# Patient Record
Sex: FEMALE | Race: WHITE | NOT HISPANIC OR LATINO | Employment: OTHER | URBAN - METROPOLITAN AREA
[De-identification: names, ages, dates, MRNs, and addresses within clinical notes are randomized per-mention and may not be internally consistent; named-entity substitution may affect disease eponyms.]

---

## 2021-05-31 ENCOUNTER — RECORDS - HEALTHEAST (OUTPATIENT)
Dept: ADMINISTRATIVE | Facility: CLINIC | Age: 71
End: 2021-05-31

## 2021-12-26 ENCOUNTER — APPOINTMENT (OUTPATIENT)
Dept: MRI IMAGING | Facility: CLINIC | Age: 71
End: 2021-12-26
Attending: EMERGENCY MEDICINE
Payer: COMMERCIAL

## 2021-12-26 ENCOUNTER — HOSPITAL ENCOUNTER (OUTPATIENT)
Facility: CLINIC | Age: 71
Setting detail: OBSERVATION
Discharge: HOME | End: 2021-12-27
Attending: INTERNAL MEDICINE | Admitting: INTERNAL MEDICINE
Payer: COMMERCIAL

## 2021-12-26 ENCOUNTER — HOSPITAL ENCOUNTER (EMERGENCY)
Facility: CLINIC | Age: 71
Discharge: ANOTHER HOSPITAL | End: 2021-12-26
Attending: EMERGENCY MEDICINE | Admitting: EMERGENCY MEDICINE
Payer: COMMERCIAL

## 2021-12-26 ENCOUNTER — APPOINTMENT (OUTPATIENT)
Dept: CT IMAGING | Facility: CLINIC | Age: 71
End: 2021-12-26
Attending: EMERGENCY MEDICINE
Payer: COMMERCIAL

## 2021-12-26 VITALS
HEART RATE: 78 BPM | TEMPERATURE: 97.7 F | OXYGEN SATURATION: 100 % | RESPIRATION RATE: 30 BRPM | WEIGHT: 219 LBS | SYSTOLIC BLOOD PRESSURE: 171 MMHG | DIASTOLIC BLOOD PRESSURE: 74 MMHG

## 2021-12-26 DIAGNOSIS — N30.00 ACUTE CYSTITIS WITHOUT HEMATURIA: ICD-10-CM

## 2021-12-26 DIAGNOSIS — G45.4 TRANSIENT GLOBAL AMNESIA: ICD-10-CM

## 2021-12-26 PROBLEM — R73.9 HYPERGLYCEMIA: Status: ACTIVE | Noted: 2021-12-26

## 2021-12-26 PROBLEM — D72.829 LEUKOCYTOSIS: Status: ACTIVE | Noted: 2021-12-26

## 2021-12-26 PROBLEM — R11.0 NAUSEA: Status: ACTIVE | Noted: 2021-12-26

## 2021-12-26 LAB
ALBUMIN UR-MCNC: 10 MG/DL
ANION GAP SERPL CALCULATED.3IONS-SCNC: 10 MMOL/L (ref 5–18)
APPEARANCE UR: CLEAR
APTT PPP: 27 SECONDS (ref 22–38)
ATRIAL RATE - MUSE: 93 BPM
BACTERIA #/AREA URNS HPF: ABNORMAL /HPF
BASOPHILS # BLD AUTO: 0 10E3/UL (ref 0–0.2)
BASOPHILS NFR BLD AUTO: 0 %
BILIRUB UR QL STRIP: NEGATIVE
BUN SERPL-MCNC: 24 MG/DL (ref 8–28)
CALCIUM SERPL-MCNC: 8.4 MG/DL (ref 8.5–10.5)
CHLORIDE BLD-SCNC: 106 MMOL/L (ref 98–107)
CO2 SERPL-SCNC: 25 MMOL/L (ref 22–31)
COLOR UR AUTO: ABNORMAL
CREAT BLD-MCNC: 0.8 MG/DL (ref 0.6–1.1)
CREAT SERPL-MCNC: 0.73 MG/DL (ref 0.6–1.1)
DIASTOLIC BLOOD PRESSURE - MUSE: 75 MMHG
EOSINOPHIL # BLD AUTO: 0.1 10E3/UL (ref 0–0.7)
EOSINOPHIL NFR BLD AUTO: 1 %
ERYTHROCYTE [DISTWIDTH] IN BLOOD BY AUTOMATED COUNT: 12.5 % (ref 10–15)
GFR SERPL CREATININE-BSD FRML MDRD: 86 ML/MIN/1.73M2
GFR SERPL CREATININE-BSD FRML MDRD: >60 ML/MIN/1.73M2
GLUCOSE BLD-MCNC: 146 MG/DL (ref 70–125)
GLUCOSE BLDC GLUCOMTR-MCNC: 135 MG/DL (ref 70–99)
GLUCOSE UR STRIP-MCNC: NEGATIVE MG/DL
HCT VFR BLD AUTO: 40.1 % (ref 35–47)
HGB BLD-MCNC: 12.9 G/DL (ref 11.7–15.7)
HGB UR QL STRIP: NEGATIVE
IMM GRANULOCYTES # BLD: 0.1 10E3/UL
IMM GRANULOCYTES NFR BLD: 0 %
INR PPP: 1.1 (ref 0.85–1.15)
INTERPRETATION ECG - MUSE: NORMAL
KETONES UR STRIP-MCNC: NEGATIVE MG/DL
LEUKOCYTE ESTERASE UR QL STRIP: NEGATIVE
LYMPHOCYTES # BLD AUTO: 0.8 10E3/UL (ref 0.8–5.3)
LYMPHOCYTES NFR BLD AUTO: 5 %
MCH RBC QN AUTO: 29.3 PG (ref 26.5–33)
MCHC RBC AUTO-ENTMCNC: 32.2 G/DL (ref 31.5–36.5)
MCV RBC AUTO: 91 FL (ref 78–100)
MONOCYTES # BLD AUTO: 0.9 10E3/UL (ref 0–1.3)
MONOCYTES NFR BLD AUTO: 6 %
NEUTROPHILS # BLD AUTO: 14.3 10E3/UL (ref 1.6–8.3)
NEUTROPHILS NFR BLD AUTO: 88 %
NITRATE UR QL: POSITIVE
NRBC # BLD AUTO: 0 10E3/UL
NRBC BLD AUTO-RTO: 0 /100
P AXIS - MUSE: 60 DEGREES
PH UR STRIP: 8.5 [PH] (ref 5–7)
PLATELET # BLD AUTO: 288 10E3/UL (ref 150–450)
POTASSIUM BLD-SCNC: 4.5 MMOL/L (ref 3.5–5)
PR INTERVAL - MUSE: 146 MS
QRS DURATION - MUSE: 84 MS
QT - MUSE: 376 MS
QTC - MUSE: 467 MS
R AXIS - MUSE: 49 DEGREES
RBC # BLD AUTO: 4.4 10E6/UL (ref 3.8–5.2)
RBC URINE: 3 /HPF
SARS-COV-2 RNA RESP QL NAA+PROBE: NEGATIVE
SODIUM SERPL-SCNC: 141 MMOL/L (ref 136–145)
SP GR UR STRIP: >1.05 (ref 1–1.03)
SQUAMOUS EPITHELIAL: 1 /HPF
SYSTOLIC BLOOD PRESSURE - MUSE: 176 MMHG
T AXIS - MUSE: 62 DEGREES
TROPONIN I SERPL-MCNC: <0.01 NG/ML (ref 0–0.29)
UROBILINOGEN UR STRIP-MCNC: <2 MG/DL
VENTRICULAR RATE- MUSE: 93 BPM
WBC # BLD AUTO: 16.3 10E3/UL (ref 4–11)
WBC URINE: 2 /HPF

## 2021-12-26 PROCEDURE — 36415 COLL VENOUS BLD VENIPUNCTURE: CPT | Performed by: EMERGENCY MEDICINE

## 2021-12-26 PROCEDURE — C9803 HOPD COVID-19 SPEC COLLECT: HCPCS

## 2021-12-26 PROCEDURE — 85730 THROMBOPLASTIN TIME PARTIAL: CPT | Performed by: EMERGENCY MEDICINE

## 2021-12-26 PROCEDURE — 70498 CT ANGIOGRAPHY NECK: CPT

## 2021-12-26 PROCEDURE — 85610 PROTHROMBIN TIME: CPT | Performed by: EMERGENCY MEDICINE

## 2021-12-26 PROCEDURE — G0379 DIRECT REFER HOSPITAL OBSERV: HCPCS

## 2021-12-26 PROCEDURE — 82565 ASSAY OF CREATININE: CPT | Mod: 91

## 2021-12-26 PROCEDURE — 87635 SARS-COV-2 COVID-19 AMP PRB: CPT | Performed by: EMERGENCY MEDICINE

## 2021-12-26 PROCEDURE — 87086 URINE CULTURE/COLONY COUNT: CPT | Performed by: EMERGENCY MEDICINE

## 2021-12-26 PROCEDURE — G0378 HOSPITAL OBSERVATION PER HR: HCPCS

## 2021-12-26 PROCEDURE — 81001 URINALYSIS AUTO W/SCOPE: CPT | Performed by: EMERGENCY MEDICINE

## 2021-12-26 PROCEDURE — 85025 COMPLETE CBC W/AUTO DIFF WBC: CPT | Performed by: EMERGENCY MEDICINE

## 2021-12-26 PROCEDURE — 250N000011 HC RX IP 250 OP 636: Performed by: EMERGENCY MEDICINE

## 2021-12-26 PROCEDURE — 96361 HYDRATE IV INFUSION ADD-ON: CPT

## 2021-12-26 PROCEDURE — 99285 EMERGENCY DEPT VISIT HI MDM: CPT | Mod: 25

## 2021-12-26 PROCEDURE — 99220 PR INITIAL OBSERVATION CARE,LEVEL III: CPT | Performed by: INTERNAL MEDICINE

## 2021-12-26 PROCEDURE — 96374 THER/PROPH/DIAG INJ IV PUSH: CPT | Mod: 59

## 2021-12-26 PROCEDURE — 84484 ASSAY OF TROPONIN QUANT: CPT | Performed by: EMERGENCY MEDICINE

## 2021-12-26 PROCEDURE — 96360 HYDRATION IV INFUSION INIT: CPT

## 2021-12-26 PROCEDURE — 93005 ELECTROCARDIOGRAM TRACING: CPT | Performed by: EMERGENCY MEDICINE

## 2021-12-26 PROCEDURE — 258N000003 HC RX IP 258 OP 636: Performed by: INTERNAL MEDICINE

## 2021-12-26 PROCEDURE — 80048 BASIC METABOLIC PNL TOTAL CA: CPT | Performed by: EMERGENCY MEDICINE

## 2021-12-26 PROCEDURE — 70551 MRI BRAIN STEM W/O DYE: CPT

## 2021-12-26 RX ORDER — ACETAMINOPHEN 650 MG/1
650 SUPPOSITORY RECTAL EVERY 6 HOURS PRN
Status: DISCONTINUED | OUTPATIENT
Start: 2021-12-26 | End: 2021-12-27 | Stop reason: HOSPADM

## 2021-12-26 RX ORDER — SODIUM CHLORIDE 9 MG/ML
INJECTION, SOLUTION INTRAVENOUS CONTINUOUS
Status: DISCONTINUED | OUTPATIENT
Start: 2021-12-26 | End: 2021-12-27

## 2021-12-26 RX ORDER — ACETAMINOPHEN 325 MG/1
650 TABLET ORAL EVERY 6 HOURS PRN
Status: DISCONTINUED | OUTPATIENT
Start: 2021-12-26 | End: 2021-12-27 | Stop reason: HOSPADM

## 2021-12-26 RX ORDER — HYDRALAZINE HYDROCHLORIDE 25 MG/1
25 TABLET, FILM COATED ORAL 3 TIMES DAILY PRN
Status: DISCONTINUED | OUTPATIENT
Start: 2021-12-26 | End: 2021-12-27 | Stop reason: HOSPADM

## 2021-12-26 RX ORDER — ONDANSETRON 2 MG/ML
4 INJECTION INTRAMUSCULAR; INTRAVENOUS EVERY 6 HOURS PRN
Status: DISCONTINUED | OUTPATIENT
Start: 2021-12-26 | End: 2021-12-27 | Stop reason: HOSPADM

## 2021-12-26 RX ORDER — CEFTRIAXONE 1 G/1
1 INJECTION, POWDER, FOR SOLUTION INTRAMUSCULAR; INTRAVENOUS ONCE
Status: COMPLETED | OUTPATIENT
Start: 2021-12-26 | End: 2021-12-26

## 2021-12-26 RX ORDER — IOPAMIDOL 755 MG/ML
100 INJECTION, SOLUTION INTRAVASCULAR ONCE
Status: COMPLETED | OUTPATIENT
Start: 2021-12-26 | End: 2021-12-26

## 2021-12-26 RX ORDER — LIDOCAINE 40 MG/G
CREAM TOPICAL
Status: DISCONTINUED | OUTPATIENT
Start: 2021-12-26 | End: 2021-12-27 | Stop reason: HOSPADM

## 2021-12-26 RX ORDER — ONDANSETRON 4 MG/1
4 TABLET, ORALLY DISINTEGRATING ORAL EVERY 6 HOURS PRN
Status: DISCONTINUED | OUTPATIENT
Start: 2021-12-26 | End: 2021-12-27 | Stop reason: HOSPADM

## 2021-12-26 RX ADMIN — IOPAMIDOL 75 ML: 755 INJECTION, SOLUTION INTRAVENOUS at 07:12

## 2021-12-26 RX ADMIN — CEFTRIAXONE 1 G: 1 INJECTION, POWDER, FOR SOLUTION INTRAMUSCULAR; INTRAVENOUS at 10:12

## 2021-12-26 RX ADMIN — SODIUM CHLORIDE: 9 INJECTION, SOLUTION INTRAVENOUS at 15:57

## 2021-12-26 ASSESSMENT — ENCOUNTER SYMPTOMS
CONFUSION: 1
APPETITE CHANGE: 0
CHILLS: 0
DIAPHORESIS: 0
FEVER: 0
COUGH: 0

## 2021-12-26 ASSESSMENT — MIFFLIN-ST. JEOR: SCORE: 1526.47

## 2021-12-26 NOTE — PHARMACY-ADMISSION MEDICATION HISTORY
Pharmacy Medication History  Admission medication history interview status for the 12/26/2021  admission is complete. See EPIC admission navigator for prior to admission medications     Location of Interview: Patient room  Medication history sources: Patient and Patient's family/friend (-William)    Significant changes made to the medication list:  None    In the past week, patient estimated taking medication this percent of the time:   Not applicable    Additional medication history information:   -She reports taking no regular medications. She may occasionally take OTC pain reliever but not on a regular basis.    Medication reconciliation completed by provider prior to medication history? No    Time spent in this activity: 5 min    Prior to Admission medications    None       The information provided in this note is only as accurate as the sources available at the time of update(s)

## 2021-12-26 NOTE — H&P
"[facility]    History and Physical  Hospitalist       Date of Admission:  12/26/2021    Assessment & Plan   71 year old female with unremarkable past medical history, who presents with possible episode of vomiting, and unable to recall any events from yesterday:    Summary:    Principal Problem:    Probable Transient global amnesia   -- does not recall what happened yesterday, Brain MRI normal at [facility] this AM   -- will check TSH, Vit B12   -- Neuro checks      Nausea -- might have vomited this AM (she doesn't remember   -- antiemetics PRN   -- IV fluids, appears dehydrated with urine Specific Gravity 1.050      Leukocytosis -- WBC 16K with no sign of infection, ?2nd to vomitting   -- CBC in AM      Hyperglycemia -- glucose 146, no hx of diabetes, ?2nd to stress   -- check glucose in AM        Covid 19 PCR negative -- and no URI symptoms.  Is not vaccinated, and her  stated (for he and the patient) that they will never get vaccinated \"because they don't know what they are doing with this Memory RNA\".   I mentioned it was Messenger RNA, and he said it doesn't matter and went on to explain how aluminum dementia, and the only thing that helps memory loss is prescription drugs, and supplements don't help ... but again I suggested to wife it would be a good idea to get a Covid vaccine at some point.     Plan: as above, discussed with patient and wife.     DVT Prophylaxis: Pneumatic Compression Devices  Code Status: Full Code    Disposition: Expected discharge tomorrow if stable.     Benjamin Rendonliza  Pager: [phone]  Cell Phone:  [phone]     Primary Care Physician   Physician No Ref-Primary    Chief Complaint   Memory loss (can't remember yesterday), may have vomited     History is obtained from Patient and      History of Present Illness    71 year old female who is healthy, was last normal at 10 PM last night when  went to bed, and then he woke up at " "5:24 AM today and noticed she wasn't in bed, and went looking for her, and she was standing facing the toilet and said \"I think I just threw up\".  On further questioning, she doesn't remember anything from yesterday.  She and her  had an uneventful [place] dinner with her daughter's family at her daughter's house, but she does not recall any of it.  No focal weakness or numbness.  No prior hx of memory problems.    brought her to [facility], where VS were normal, Brain MRI was normal, WBC was 16.3, glucose 146, but BMP normal and UA with SG > 1.050, nitrite positive, only 2 WBC's, 3 RBC's, and no reported dysuria and no hx of UTI.      Transferred to [facility] per their protocol because they have no neurologist that rounds there.      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  (last updated 2021)       Social History     Tobacco Use     Smoking status: Never Smoker     Smokeless tobacco: Not on file   Substance Use Topics     Alcohol use: Yes     Comment: < 1 drink a month     Drug use: Not on file        FAMILY HISTORY    Family History   Problem Relation Age of Onset     Lung Cancer Mother          age 82     Lung Cancer Father          age 69     Heart Disease Brother      Heart Disease Brother         Review of Systems   The 10 point Review of Systems is negative other than noted in the HPI or here.       PHYSICAL EXAM     Temp: 98.1  F (36.7  C) Temp src: Oral BP: (!) 166/84 Pulse: 85   Resp: 24 SpO2: 100 % O2 Device: None (Room air)    Vital Signs with Ranges  Temp:  [97.7  F (36.5 " " C)-98.1  F (36.7  C)] 98.1  F (36.7  C)  Pulse:  [77-92] 85  Resp:  [7-30] 24  BP: (154-191)/(69-84) 166/84  SpO2:  [97 %-100 %] 100 %  218 lbs 0 oz    Constitutional: Awake, alert, cooperative, no apparent distress.  Eyes: Conjunctiva and pupils examined and normal.  HEENT: Moist mucous membranes, normal dentition.  Respiratory: Clear to auscultation bilaterally, no crackles or wheezing.  Cardiovascular: Regular rate and rhythm, normal S1 and S2, and no murmur noted, no carotid bruits.  No ankle edema.   GI: Soft, non-distended, non-tender, normal bowel sounds.  Lymph/Hematologic: No anterior cervical, supraclavicular or axillary adenopathy.  Skin: No rashes, no cyanosis.   Musculoskeletal: No joint swelling, erythema or tenderness.  Neurologic: Alert, Ox3, but hesitant saying \"I know yesterday was [place] but I just can't remember any of it\", Cranial nerves 2-12 intact, no focal weakness or numbness  Psychiatric:  No obvious anxiety or depression.    Data   Data reviewed today:  I personally reviewed the EKG tracing showing NSR with rate of 93, slow R wave progression (decreased R wave in V3 compared to V2), otherwise normal EKG.  Recent Labs   Lab 12/26/21  0735 12/26/21  0715 12/26/21  0714 12/26/21  0657 12/26/21  0640   WBC  --   --  16.3*  --   --    HGB  --   --  12.9  --   --    MCV  --   --  91  --   --    PLT  --   --  288  --   --    INR  --  1.10  --   --   --      --   --   --   --    POTASSIUM 4.5  --   --   --   --    CHLORIDE 106  --   --   --   --    CO2 25  --   --   --   --    BUN 24  --   --   --   --    CR 0.73  --   --  0.8  --    ANIONGAP 10  --   --   --   --    WILD 8.4*  --   --   --   --    *  --   --   --  135*       Imaging:  Recent Results (from the past 24 hour(s))   CTA Head Neck with Contrast    Narrative    EXAM: CTA  HEAD NECK WITH CONTRAST  LOCATION: [facility]  DATE/TIME: 12/26/2021 6:45 AM    INDICATION: Code Stroke to evaluate for " potential thrombolysis and thrombectomy.  PLEASE READ IMMEDIATELY. Memory loss. Possible transient global amnesia.  COMPARISON: None.  CONTRAST: 75ml Isovue 370  TECHNIQUE: Head and neck CT angiogram with IV contrast. Noncontrast head CT followed by axial helical CT images of the head and neck vessels obtained during the arterial phase of intravenous contrast administration. Axial 2D reconstructed images and   multiplanar 3D MIP reconstructed images of the head and neck vessels were performed by the technologist. Dose reduction techniques were used. All stenosis measurements made according to NASCET criteria unless otherwise specified.    FINDINGS:   NONCONTRAST HEAD CT:   INTRACRANIAL CONTENTS: No intracranial hemorrhage, extraaxial collection, or mass effect.  No CT evidence of acute infarct. Mild presumed chronic small vessel ischemic changes. Mild generalized volume loss. No hydrocephalus.     VISUALIZED ORBITS/SINUSES/MASTOIDS: No intraorbital abnormality. No paranasal sinus mucosal disease. No middle ear or mastoid effusion.    BONES/SOFT TISSUES: No acute abnormality.    HEAD CTA:  ANTERIOR CIRCULATION: All of the major large-caliber proximal anterior circulation vessels are patent without aneurysm or AVM. Mild narrowing in the distal A2 segment of the right anterior cerebral artery likely atheromatous in nature. Standard [place] anatomy.    POSTERIOR CIRCULATION: No stenosis/occlusion, aneurysm, or high flow vascular malformation. Balanced vertebral arteries supply a normal basilar artery.     DURAL VENOUS SINUSES: Expected enhancement of the major dural venous sinuses.    NECK CTA:  RIGHT CAROTID: No measurable stenosis or dissection.    LEFT CAROTID: No measurable stenosis or dissection.    VERTEBRAL ARTERIES: No focal stenosis or dissection. Balanced vertebral arteries.    AORTIC ARCH: Classic aortic arch anatomy with no significant stenosis at the origin of the great vessels.    NONVASCULAR  STRUCTURES: Small low-density nodule in the left thyroid gland nonspecific.      Impression    IMPRESSION:   HEAD CT:  1.  No acute intracranial abnormality.  2.  Mild age-related changes.    HEAD CTA:   1.  All of the major large-caliber intracranial vessels are patent without aneurysm or AVM.  2.  Mild narrowing in the distal A2 segment of the right anterior cerebral artery noted which is likely atheromatous in nature.    NECK CTA:  1.  Normal neck CTA.  2.  Noncontrast head CT findings called to Dr. Shore in the [facility] ER by Dr. García at 6:57 AM. CT angiogram results called to Dr. Shore by me at 7:20 AM on 12/26/2021.   MR Brain w/o Contrast    Narrative    EXAM: MR BRAIN W/O CONTRAST  LOCATION: [facility]  DATE/TIME: 12/26/2021 8:49 AM    INDICATION: Amnesia/memory loss.  COMPARISON: CT/CTA earlier today.  TECHNIQUE: Routine multiplanar multisequence head MRI without intravenous contrast.    FINDINGS:  INTRACRANIAL CONTENTS: No acute or subacute infarct. No mass, acute hemorrhage, or extra-axial fluid collections. Scattered nonspecific T2/FLAIR hyperintensities within the cerebral white matter most consistent with mild chronic microvascular ischemic   change. Mild generalized cerebral atrophy. No hydrocephalus. Normal position of the cerebellar tonsils.     SELLA: No abnormality accounting for technique.    OSSEOUS STRUCTURES/SOFT TISSUES: Normal marrow signal. The major intracranial vascular flow voids are maintained.     ORBITS: No abnormality accounting for technique.     SINUSES/MASTOIDS: Mild mucosal thickening scattered about the paranasal sinuses. No middle ear or mastoid effusion.       Impression    IMPRESSION:  1.  No evidence for acute or subacute infarct, acute hemorrhage, mass lesion or obstructive type hydrocephalus.  2.  Mild age-related changes.

## 2021-12-26 NOTE — CONSULTS
"    [facility]    Stroke Telephone Note    I was called by Sonal Shore on 12/26/21 regarding patient Yusra Rangel. The patient is a 71 year old female who went to bed at 10 pm last night feeling fine. Then her  found her in the bathroom vomiting at 5:24 am. She did not remember that yesterday was [place]. In the ED, her BP is elevated and she continues to be amnestic to yesterday being [place] although she was told multiple times.    BP (!) 170/78   Pulse 91   Temp 97.7  F (36.5  C) (Oral)   Resp 20   Wt 99.3 kg (219 lb)   SpO2 98%       Stroke Code Data (for stroke code without tele)  Stroke code activated 12/26/21   0640   Stroke provider first response  12/26/21   0643            Last known normal 12/24/21   2200        Time of discovery   (or onset of symptoms) 12/26/21   0524   Head CT read by Stroke Neuro Dr/Provider 12/26/21   0655   Was stroke code de-escalated? Yes 12/26/21    patient is outside emergent treatment time parameters       Imaging Findings   Head CT: negative for acute pathology.   CTA head and neck: no stenosis or occlusion.    Thrombolytic Treatment   Not given due to unclear or unfavorable risk-benefit profile for extended window thrombolysis beyond the conventional 4.5 hour time window.    Endovascular Treatment  Not initiated due to absence of proximal vessel occlusion    Impression  Acute encephalopathy, TGA vs stroke vs seizure      Recommendations   MRI brain with contrast. If the MRI is negative for stroke, then no further stroke workup and general neurology consultation is recommended. If the MRI shows a stroke, our recommendations are as follows:   - Use orderset: \"Ischemic Stroke Routine Admission\" or \"Ischemic Stroke No Thrombolytics/No Thrombectomy ICU Admission\"  - Neurochecks and Vital Signs every 4 hours   - Permissive HTN; goal SBP < 220 mmHg  - Daily aspirin 81 mg for secondary stroke prevention  - Statin: atorvastatin " "40  - Telemetry, EKG  - Bedside Glucose Monitoring  - A1c, Lipid Panel, Troponin x 3  - PT/OT/SLP  - Stroke Education  - Euthermia, Euglycemia    My recommendations are based on the information provided over the phone by Yusra Rangel's in-person providers. They are not intended to replace the clinical judgment of her in-person providers. I was not requested to personally see or examine the patient at this time.        Alexander Lopez MD, Msc, FAHA, FAAN   of Neurology  [facility]     12/26/2021 7:37 AM  To page me or covering stroke neurology team member, click here: AMCOM  Choose \"On Call\" tab at top, then search dropdown box for \"Neurology Adult\" & press Enter, look for Neuro ICU/Stroke    "

## 2021-12-26 NOTE — ED TRIAGE NOTES
"Patient was brought into the ED by her    Patient's  states \"I woke up this morning and my wife had said she vomited but I didn't smell anything. Then I noticed she didn't remember yesterday was [place]. That's when I knew she had a stroke. So I gave her 4 baby aspirin before coming here\"    Patient is able to ambulate independently, no pronator drift noted on neuro assessment, no facial drooping noted, negative BEFAST exam   Patient unable to recall the previous day.   Patient keeps asking \"what time is it?  What day is it?\"    Last known well 2200 on 12/25/2021   Blood sugar 135 on arrival   Stroke code tier 2 called per ED physician   "

## 2021-12-26 NOTE — ED PROVIDER NOTES
EMERGENCY DEPARTMENT ENCOUNTER      NAME: Yusra Rangel  AGE: 71 year old female  YOB: 1950  MRN: [id number]  EVALUATION DATE & TIME: No admission date for patient encounter.    PCP: No primary care provider on file.    ED PROVIDER: Sonal Shore M.D.      Chief Complaint   Patient presents with     Stroke Symptoms         FINAL IMPRESSION:  1. Acute cystitis without hematuria    2. Transient global amnesia          ED COURSE & MEDICAL DECISION MAKING:    Pertinent Labs & Imaging studies reviewed. (See chart for details)  71 year old female presents to the Emergency Department for evaluation of amnesia.  Patient's last known well was 10 PM last evening.  This morning at 5:24 AM, the patient's  found her in the bathroom, he she did not appear baseline.  He states that since then, the patient has had ongoing amnesia, she is unable to remember that yesterday was Liv and every time she is reminded of it, she cannot retain the information. Given she was not at baseline, tier 2 stroke code was called. CTA head and neck was unremarkable. The patient is not a TNK candidate.     At the conclusion of the encounter I discussed the results of all of the tests and the disposition. The questions were answered. The patient or family acknowledged understanding and was agreeable with the care plan.     6:36 AM RN called provider out to triage. I met with patient for initial interview and exam. PPE includes N95, protective glasses, gloves.  6:39 AM Tier 2 stroke code was called.  6:43 AM Spoke to Dr. Lopez, stroke neuro.  7:13 AM Spoke to Dr. Lopez, stroke neuro who recommends stroke code to be cancelled after reviewing diagnostic imaging and lab work.   9:40 AM I updated the patient and her  with her results and discussed plan for admission. Patient remains amnestic to current events and unable to retain information. They were agreeable with plan.  10:26 AM Spoke to Dr. Parmar,  [facility] hospitalist who accepts patient for admission.       MEDICATIONS GIVEN IN THE EMERGENCY:  Medications   cefTRIAXone (ROCEPHIN) 1 g vial to attach to  mL bag for ADULTS or NS 50 mL bag for PEDS (1 g Intravenous New Bag 12/26/21 1012)   iopamidol (ISOVUE-370) solution 100 mL (75 mLs Intravenous Given 12/26/21 0712)       NEW PRESCRIPTIONS STARTED AT TODAY'S ER VISIT  New Prescriptions    No medications on file            =================================================================    HPI    Patient information was obtained from: patient's     Use of : N/A         Yusra Rangel is a 71 year old female with no pertinent history who presents to this ED via car, for evaluation of stroke symptoms.    Patient's  reports that she was last known well at 10 PM last night before she went to bed. This morning he awoke approximately at 5:24 AM to see her standing in the bathroom and had informed him that she had an episode of emesis. She was unable to recall yesterday and does not remember Liv is over. The last thing she remembers is getting ready for Liv. She otherwise has been able to walk fine and has no medical complaints of anything. He did check her blood pressure which was systolic 140 and prior to arrival he gave her 4 tablets of Aspirin 41 mg. She is able to identify him and tell him to slow down while driving.     The patient currently denies fever, chills, sweats, cough, congestion, appetite change, or any other associated symptoms. No other medical complaints at this time.        REVIEW OF SYSTEMS   Review of Systems   Constitutional: Negative for appetite change, chills, diaphoresis and fever.   HENT: Negative for congestion.    Respiratory: Negative for cough.    Psychiatric/Behavioral: Positive for confusion.   All other systems reviewed and are negative.       PAST MEDICAL HISTORY:  History reviewed. No pertinent past medical history.    PAST SURGICAL  HISTORY:  History reviewed. No pertinent surgical history.        CURRENT MEDICATIONS:    Current Facility-Administered Medications   Medication     cefTRIAXone (ROCEPHIN) 1 g vial to attach to  mL bag for ADULTS or NS 50 mL bag for PEDS     No current outpatient medications on file.         ALLERGIES:  No Known Allergies    FAMILY HISTORY:  History reviewed. No pertinent family history.    SOCIAL HISTORY:   Social History     Socioeconomic History     Marital status:      Spouse name: Not on file     Number of children: Not on file     Years of education: Not on file     Highest education level: Not on file   Occupational History     Not on file   Tobacco Use     Smoking status: Not on file     Smokeless tobacco: Not on file   Substance and Sexual Activity     Alcohol use: Not on file     Drug use: Not on file     Sexual activity: Not on file   Other Topics Concern     Not on file   Social History Narrative     Not on file     Social Determinants of Health     Financial Resource Strain: Not on file   Food Insecurity: Not on file   Transportation Needs: Not on file   Physical Activity: Not on file   Stress: Not on file   Social Connections: Not on file   Intimate Partner Violence: Not on file   Housing Stability: Not on file       VITALS:  BP (!) 181/72   Pulse 77   Temp 97.7  F (36.5  C) (Oral)   Resp 30   Wt 99.3 kg (219 lb)   SpO2 100%     PHYSICAL EXAM    Gen:  Alert, awake, NAD  HENT:  Head atraumatic, PERRL, EOMI, no meningismus  Respiratory:  CTA, normal respiratory rate  Cardiovascular:  Regular rate and rhythm, no murmur  Abdomen:  Soft, nontender, normoactive bowel sounds  Musculoskeletal:  FROM in all extremities with no tenderness  Integument:  No rash, warm, dry  Neuro:  GCS 15, Not alert and oriented to time or date, CN II - XII intact, no dysdiadochokinesia, negative Romberg, no pronator drift, no nystagmus, visual fields full to confrontation, normal gait, +5/5 strength in all  extremities      National [employer] Stroke Scale  Exam Interval: Baseline   Score    Level of consciousness: (0)   Alert, keenly responsive    LOC questions: (0)   Answers both questions correctly    LOC commands: (0)   Performs both tasks correctly    Best gaze: (0)   Normal    Visual: (0)   No visual loss    Facial palsy: (0)   Normal symmetrical movements    Motor arm (left): (0)   No drift    Motor arm (right): (0)   No drift    Motor leg (left): (0)   No drift    Motor leg (right): (0)   No drift    Limb ataxia: (0)   Absent    Sensory: (0)   Normal- no sensory loss    Best language: (0)   Normal- no aphasia    Dysarthria: (0)   Normal    Extinction and inattention: (0)   No abnormality        Total Score:  0          LAB/RADIOLOGY:  All pertinent labs and imaging reviewed and interpreted. Please see official radiology report.  Results for orders placed or performed during the hospital encounter of 12/26/21   CTA Head Neck with Contrast    Impression    IMPRESSION:   HEAD CT:  1.  No acute intracranial abnormality.  2.  Mild age-related changes.    HEAD CTA:   1.  All of the major large-caliber intracranial vessels are patent without aneurysm or AVM.  2.  Mild narrowing in the distal A2 segment of the right anterior cerebral artery noted which is likely atheromatous in nature.    NECK CTA:  1.  Normal neck CTA.  2.  Noncontrast head CT findings called to Dr. Shore in the [facility] ER by Dr. García at 6:57 AM. CT angiogram results called to Dr. Shore by me at 7:20 AM on 12/26/2021.   MR Brain w/o Contrast    Impression    IMPRESSION:  1.  No evidence for acute or subacute infarct, acute hemorrhage, mass lesion or obstructive type hydrocephalus.  2.  Mild age-related changes.   Basic metabolic panel   Result Value Ref Range    Sodium 141 136 - 145 mmol/L    Potassium 4.5 3.5 - 5.0 mmol/L    Chloride 106 98 - 107 mmol/L    Carbon Dioxide (CO2) 25 22 - 31 mmol/L    Anion Gap 10 5 - 18  mmol/L    Urea Nitrogen 24 8 - 28 mg/dL    Creatinine 0.73 0.60 - 1.10 mg/dL    Calcium 8.4 (L) 8.5 - 10.5 mg/dL    Glucose 146 (H) 70 - 125 mg/dL    GFR Estimate 86 >60 mL/min/1.73m2   Result Value Ref Range    INR 1.10 0.85 - 1.15   Partial thromboplastin time   Result Value Ref Range    aPTT 27 22 - 38 Seconds   Result Value Ref Range    Troponin I <0.01 0.00 - 0.29 ng/mL   Glucose by meter   Result Value Ref Range    GLUCOSE BY METER POCT 135 (H) 70 - 99 mg/dL   CBC with platelets and differential   Result Value Ref Range    WBC Count 16.3 (H) 4.0 - 11.0 10e3/uL    RBC Count 4.40 3.80 - 5.20 10e6/uL    Hemoglobin 12.9 11.7 - 15.7 g/dL    Hematocrit 40.1 35.0 - 47.0 %    MCV 91 78 - 100 fL    MCH 29.3 26.5 - 33.0 pg    MCHC 32.2 31.5 - 36.5 g/dL    RDW 12.5 10.0 - 15.0 %    Platelet Count 288 150 - 450 10e3/uL    % Neutrophils 88 %    % Lymphocytes 5 %    % Monocytes 6 %    % Eosinophils 1 %    % Basophils 0 %    % Immature Granulocytes 0 %    NRBCs per 100 WBC 0 <1 /100    Absolute Neutrophils 14.3 (H) 1.6 - 8.3 10e3/uL    Absolute Lymphocytes 0.8 0.8 - 5.3 10e3/uL    Absolute Monocytes 0.9 0.0 - 1.3 10e3/uL    Absolute Eosinophils 0.1 0.0 - 0.7 10e3/uL    Absolute Basophils 0.0 0.0 - 0.2 10e3/uL    Absolute Immature Granulocytes 0.1 <=0.4 10e3/uL    Absolute NRBCs 0.0 10e3/uL   Creatinine POCT   Result Value Ref Range    Creatinine POCT 0.8 0.6 - 1.1 mg/dL    GFR, ESTIMATED POCT >60 >60 mL/min/1.73m2   UA with Microscopic reflex to Culture    Specimen: Urine, NOS   Result Value Ref Range    Color Urine Light Yellow Colorless, Straw, Light Yellow, Yellow    Appearance Urine Clear Clear    Glucose Urine Negative Negative mg/dL    Bilirubin Urine Negative Negative    Ketones Urine Negative Negative mg/dL    Specific Gravity Urine >1.050 (H) 1.001 - 1.030    Blood Urine Negative Negative    pH Urine 8.5 (H) 5.0 - 7.0    Protein Albumin Urine 10  (A) Negative mg/dL    Urobilinogen Urine <2.0 <2.0 mg/dL    Nitrite  Urine Positive (A) Negative    Leukocyte Esterase Urine Negative Negative    Bacteria Urine Few (A) None Seen /HPF    RBC Urine 3 (H) <=2 /HPF    WBC Urine 2 <=5 /HPF    Squamous Epithelials Urine 1 <=1 /HPF   ECG 12-LEAD WITH MUSE (LHE)   Result Value Ref Range    Systolic Blood Pressure 176 mmHg    Diastolic Blood Pressure 75 mmHg    Ventricular Rate 93 BPM    Atrial Rate 93 BPM    ND Interval 146 ms    QRS Duration 84 ms     ms    QTc 467 ms    P Axis 60 degrees    R AXIS 49 degrees    T Axis 62 degrees    Interpretation ECG       Sinus rhythm  Cannot rule out Anterior infarct (cited on or before 26-DEC-2021)  Abnormal ECG  When compared with ECG of 26-DEC-2021 07:10,  Premature ventricular complexes are no longer Present  Confirmed by SEE ED PROVIDER NOTE FOR, ECG INTERPRETATION ([id number]),  [place] ([id number]) on 12/26/2021 7:16:16 AM           EKG:    Performed at: 26-DEC-2021 07:13    Impression: Sinus rhythm. When compared with 26-DEC-2021 07:10, premature ventricular complexes are no longer present    Rate: 93 BPM  Rhythm: Sinus rhythm  Axis: 49  ND Interval: 146 ms  QRS Interval: 84 ms  QTc Interval: 467 ms  ST Changes: Premature ventricular complexes are no longer present  Comparison: 26-DEC-2021 07:10    I have independently reviewed and interpreted the EKG(s) documented above.    PROCEDURES:   None      I, Natalie Cardona, am serving as a scribe to document services personally performed by Dr. Shore based on my observation and the provider's statements to me. I, Sonal Shore MD attest that Natalie Cardona, is acting in a scribe capacity, has observed my performance of the services and has documented them in accordance with my direction.    Sonal Shore M.D.  Emergency Medicine  [facility] EMERGENCY ROOM  [address] [address] [phone]  Dept: [phone]     Sonal Shore MD  12/26/21 1111

## 2021-12-26 NOTE — PROVIDER NOTIFICATION
MD Notification    Notified Person: MD    Notified Person Name: Dr Evangelista    Notification Date/Time: 1305    Notification Interaction:    Purpose of Notification:  Pt arrived from [facility].  Admission orders needed    Orders Received:    Comments:

## 2021-12-27 VITALS
TEMPERATURE: 97.8 F | RESPIRATION RATE: 16 BRPM | WEIGHT: 218 LBS | OXYGEN SATURATION: 97 % | SYSTOLIC BLOOD PRESSURE: 143 MMHG | HEIGHT: 67 IN | HEART RATE: 63 BPM | BODY MASS INDEX: 34.21 KG/M2 | DIASTOLIC BLOOD PRESSURE: 74 MMHG

## 2021-12-27 LAB
ANION GAP SERPL CALCULATED.3IONS-SCNC: 5 MMOL/L (ref 3–14)
BUN SERPL-MCNC: 15 MG/DL (ref 7–30)
CALCIUM SERPL-MCNC: 8.4 MG/DL (ref 8.5–10.1)
CHLORIDE BLD-SCNC: 112 MMOL/L (ref 94–109)
CO2 SERPL-SCNC: 24 MMOL/L (ref 20–32)
CREAT SERPL-MCNC: 0.73 MG/DL (ref 0.52–1.04)
ERYTHROCYTE [DISTWIDTH] IN BLOOD BY AUTOMATED COUNT: 12.9 % (ref 10–15)
GFR SERPL CREATININE-BSD FRML MDRD: 86 ML/MIN/1.73M2
GLUCOSE BLD-MCNC: 123 MG/DL (ref 70–99)
HCT VFR BLD AUTO: 41.3 % (ref 35–47)
HGB BLD-MCNC: 13.1 G/DL (ref 11.7–15.7)
MCH RBC QN AUTO: 29.6 PG (ref 26.5–33)
MCHC RBC AUTO-ENTMCNC: 31.7 G/DL (ref 31.5–36.5)
MCV RBC AUTO: 93 FL (ref 78–100)
PLATELET # BLD AUTO: 286 10E3/UL (ref 150–450)
POTASSIUM BLD-SCNC: 4.1 MMOL/L (ref 3.4–5.3)
RBC # BLD AUTO: 4.43 10E6/UL (ref 3.8–5.2)
SODIUM SERPL-SCNC: 141 MMOL/L (ref 133–144)
TSH SERPL DL<=0.005 MIU/L-ACNC: 3.47 MU/L (ref 0.4–4)
VIT B12 SERPL-MCNC: 536 PG/ML (ref 193–986)
WBC # BLD AUTO: 7.5 10E3/UL (ref 4–11)

## 2021-12-27 PROCEDURE — 85027 COMPLETE CBC AUTOMATED: CPT | Performed by: INTERNAL MEDICINE

## 2021-12-27 PROCEDURE — 82607 VITAMIN B-12: CPT | Performed by: INTERNAL MEDICINE

## 2021-12-27 PROCEDURE — 84443 ASSAY THYROID STIM HORMONE: CPT | Performed by: INTERNAL MEDICINE

## 2021-12-27 PROCEDURE — 99217 PR OBSERVATION CARE DISCHARGE: CPT | Performed by: HOSPITALIST

## 2021-12-27 PROCEDURE — 80048 BASIC METABOLIC PNL TOTAL CA: CPT | Performed by: INTERNAL MEDICINE

## 2021-12-27 PROCEDURE — 36415 COLL VENOUS BLD VENIPUNCTURE: CPT | Performed by: INTERNAL MEDICINE

## 2021-12-27 PROCEDURE — G0378 HOSPITAL OBSERVATION PER HR: HCPCS

## 2021-12-27 NOTE — PLAN OF CARE
A+O x 2-3 (self, time, and situation). Having some transient global amnesia. Long term memory ok, but was forgetting where she was on tobi sun and tobi. Transfer from [facility]. MRI, CT, and x-rays are negative for any ischemic changes. All other neuros besides cognition are well intact. 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 Regular diet. Urinating well, no BM for a couple of days. Full code. On observation over night. Will most likely go home tomorrow barring any set-backs.     Dr. Evangelista said that he would be fine with allowing the patient to have 3 neuro checks q 4 hours, and then let her sleep through the night.

## 2021-12-27 NOTE — DISCHARGE SUMMARY
"[facility]  Hospitalist Discharge Summary      Date of Admission:  12/26/2021  Date of Discharge:  12/27/2021  Discharging Provider: Nelly Gil MD      Discharge Diagnoses   Transient global amnesia, resolving    Follow-ups Needed After Discharge   Follow-up Appointments     Follow-up and recommended labs and tests       Follow up with primary care provider, Physician No Ref-Primary, within 7   days for hospital follow- up.  No follow up labs or test are needed.             Unresulted Labs Ordered in the Past 30 Days of this Admission     Date and Time Order Name Status Description    12/27/2021 12:02 AM Vitamin B12 In process     12/26/2021  8:59 AM Urine Culture In process       These results will be followed up by PCP    Discharge Disposition   Discharged to home  Condition at discharge: Stable    Hospital Course   71 year old female with unremarkable past medical history, who presents with possible episode of vomiting, and unable to recall any events from yesterday:    Summary:    Principal Problem:    Probable Transient global amnesia   -- memory returning, Brain MRI normal at [facility] this AM      Nausea -- resolved      Leukocytosis -- resolved       Covid 19 PCR negative -- and no URI symptoms.  Is not vaccinated, and her  stated (for he and the patient) that they will never get vaccinated \"because they don't know what they are doing with this Memory RNA\".   I mentioned it was Messenger RNA, and he said it doesn't matter and went on to explain how aluminum dementia, and the only thing that helps memory loss is prescription drugs, and supplements don't help ... but again I suggested to wife it would be a good idea to get a Covid vaccine at some point.       Consultations This Hospital Stay   None    Code Status   Full Code    Time Spent on this Encounter   I, Nelly Gil MD, personally saw the patient today and spent less than or equal to 30 minutes discharging " this patient.       eNlly Gil MD  [place] [address] [facility] [address]  [address] [place] [address]  Phone: [phone]  ______________________________________________________________________    Physical Exam   Vital Signs: Temp: 97.8  F (36.6  C) Temp src: Oral BP: (!) 143/74 Pulse: 63   Resp: 16 SpO2: 97 % O2 Device: None (Room air)    Weight: 218 lbs 0 oz  Constitutional: Awake, alert, cooperative, no apparent distress  HEENT: neck supple, no LAD noted, moist mucous membranes  Respiratory: Clear to auscultation bilaterally, no crackles or wheezing  Cardiovascular: Regular rate and rhythm, normal S1 and S2, and no murmur noted  GI: Normal bowel sounds, soft, non-distended, non-tender  Skin/Integumen: No rashes, no cyanosis, no edema  Ext: no edema, moving all extremities  Neuro: CN 2-12 intact, non focal exam       Primary Care Physician   Physician No Ref-Primary    Discharge Orders      Reason for your hospital stay    Transient amnesia     Follow-up and recommended labs and tests     Follow up with primary care provider, Physician No Ref-Primary, within 7 days for hospital follow- up.  No follow up labs or test are needed.     Activity    Your activity upon discharge: activity as tolerated     Diet    Follow this diet upon discharge: Orders Placed This Encounter      Regular Diet Adult       Significant Results and Procedures   Most Recent 3 CBC's:Recent Labs   Lab Test 12/27/21  0732 12/26/21  0714   WBC 7.5 16.3*   HGB 13.1 12.9   MCV 93 91    288     Most Recent 3 BMP's:Recent Labs   Lab Test 12/27/21  0732 12/26/21  0735 12/26/21  0657 12/26/21  0640    141  --   --    POTASSIUM 4.1 4.5  --   --    CHLORIDE 112* 106  --   --    CO2 24 25  --   --    BUN 15 24  --   --    CR 0.73 0.73 0.8  --    ANIONGAP 5 10  --   --    WILD 8.4* 8.4*  --   --    * 146*  --  135*   ,   Results for orders placed or performed during the hospital encounter of 12/26/21   CTA  Head Neck with Contrast    Narrative    EXAM: CTA  HEAD NECK WITH CONTRAST  LOCATION: [facility]  DATE/TIME: 12/26/2021 6:45 AM    INDICATION: Code Stroke to evaluate for potential thrombolysis and thrombectomy.  PLEASE READ IMMEDIATELY. Memory loss. Possible transient global amnesia.  COMPARISON: None.  CONTRAST: 75ml Isovue 370  TECHNIQUE: Head and neck CT angiogram with IV contrast. Noncontrast head CT followed by axial helical CT images of the head and neck vessels obtained during the arterial phase of intravenous contrast administration. Axial 2D reconstructed images and   multiplanar 3D MIP reconstructed images of the head and neck vessels were performed by the technologist. Dose reduction techniques were used. All stenosis measurements made according to NASCET criteria unless otherwise specified.    FINDINGS:   NONCONTRAST HEAD CT:   INTRACRANIAL CONTENTS: No intracranial hemorrhage, extraaxial collection, or mass effect.  No CT evidence of acute infarct. Mild presumed chronic small vessel ischemic changes. Mild generalized volume loss. No hydrocephalus.     VISUALIZED ORBITS/SINUSES/MASTOIDS: No intraorbital abnormality. No paranasal sinus mucosal disease. No middle ear or mastoid effusion.    BONES/SOFT TISSUES: No acute abnormality.    HEAD CTA:  ANTERIOR CIRCULATION: All of the major large-caliber proximal anterior circulation vessels are patent without aneurysm or AVM. Mild narrowing in the distal A2 segment of the right anterior cerebral artery likely atheromatous in nature. Standard [place] of   [place] anatomy.    POSTERIOR CIRCULATION: No stenosis/occlusion, aneurysm, or high flow vascular malformation. Balanced vertebral arteries supply a normal basilar artery.     DURAL VENOUS SINUSES: Expected enhancement of the major dural venous sinuses.    NECK CTA:  RIGHT CAROTID: No measurable stenosis or dissection.    LEFT CAROTID: No measurable stenosis or dissection.    VERTEBRAL  ARTERIES: No focal stenosis or dissection. Balanced vertebral arteries.    AORTIC ARCH: Classic aortic arch anatomy with no significant stenosis at the origin of the great vessels.    NONVASCULAR STRUCTURES: Small low-density nodule in the left thyroid gland nonspecific.      Impression    IMPRESSION:   HEAD CT:  1.  No acute intracranial abnormality.  2.  Mild age-related changes.    HEAD CTA:   1.  All of the major large-caliber intracranial vessels are patent without aneurysm or AVM.  2.  Mild narrowing in the distal A2 segment of the right anterior cerebral artery noted which is likely atheromatous in nature.    NECK CTA:  1.  Normal neck CTA.  2.  Noncontrast head CT findings called to Dr. Shore in the [facility] ER by Dr. García at 6:57 AM. CT angiogram results called to Dr. Shore by me at 7:20 AM on 12/26/2021.   MR Brain w/o Contrast    Narrative    EXAM: MR BRAIN W/O CONTRAST  LOCATION: [facility]  DATE/TIME: 12/26/2021 8:49 AM    INDICATION: Amnesia/memory loss.  COMPARISON: CT/CTA earlier today.  TECHNIQUE: Routine multiplanar multisequence head MRI without intravenous contrast.    FINDINGS:  INTRACRANIAL CONTENTS: No acute or subacute infarct. No mass, acute hemorrhage, or extra-axial fluid collections. Scattered nonspecific T2/FLAIR hyperintensities within the cerebral white matter most consistent with mild chronic microvascular ischemic   change. Mild generalized cerebral atrophy. No hydrocephalus. Normal position of the cerebellar tonsils.     SELLA: No abnormality accounting for technique.    OSSEOUS STRUCTURES/SOFT TISSUES: Normal marrow signal. The major intracranial vascular flow voids are maintained.     ORBITS: No abnormality accounting for technique.     SINUSES/MASTOIDS: Mild mucosal thickening scattered about the paranasal sinuses. No middle ear or mastoid effusion.       Impression    IMPRESSION:  1.  No evidence for acute or subacute infarct, acute  hemorrhage, mass lesion or obstructive type hydrocephalus.  2.  Mild age-related changes.       Discharge Medications   There are no discharge medications for this patient.    Allergies   No Known Allergies

## 2021-12-27 NOTE — PROVIDER NOTIFICATION
"Date paged: 12/27/2021    Time paged: [phone]    Person paged: Louise    Paging system used: Web-Based paging    Message: \"Notifying you that the patient in 703-2 (LA) has a PIV that is leaking. Are we able to discontinue the Normal Saline since the patient is eating/drinking and may be discharged today? Or would you like the PIV to be replaced? Thanks, *[id number]\"    Orders: Peripheral IV removed and Normal Saline continuous infusion discontinued.  "

## 2021-12-27 NOTE — PROGRESS NOTES
Diagnostic tests and consults completed and resulted: Yes    Vital signs normal or at patient baseline: Yes    -Discharge orders received

## 2021-12-27 NOTE — PLAN OF CARE
Pt here with transient global amnesia. A&Ox4. Neuros are stable. VSS. Tele SR. Regular diet, thin liquids. Takes pills whole with water. Up with SBA. Contienent of B&B. Denies pain. Pt scoring green on the Aggression Stop Light Tool. Pt slept well during the night, no distress noted. Discharge

## 2021-12-28 LAB — BACTERIA UR CULT: ABNORMAL

## 2021-12-28 RX ORDER — CEPHALEXIN 500 MG/1
500 CAPSULE ORAL 4 TIMES DAILY
Qty: 28 CAPSULE | Refills: 0 | Status: SHIPPED | OUTPATIENT
Start: 2021-12-28 | End: 2022-01-04

## 2023-12-18 ENCOUNTER — TELEPHONE (OUTPATIENT)
Dept: FAMILY MEDICINE | Facility: CLINIC | Age: 73
End: 2023-12-18

## 2023-12-18 ENCOUNTER — OFFICE VISIT (OUTPATIENT)
Dept: FAMILY MEDICINE | Facility: CLINIC | Age: 73
End: 2023-12-18
Payer: COMMERCIAL

## 2023-12-18 VITALS
RESPIRATION RATE: 16 BRPM | DIASTOLIC BLOOD PRESSURE: 64 MMHG | BODY MASS INDEX: 35.17 KG/M2 | OXYGEN SATURATION: 97 % | WEIGHT: 224.1 LBS | TEMPERATURE: 97.7 F | HEART RATE: 67 BPM | SYSTOLIC BLOOD PRESSURE: 136 MMHG | HEIGHT: 67 IN

## 2023-12-18 DIAGNOSIS — Z13.220 SCREENING FOR HYPERLIPIDEMIA: ICD-10-CM

## 2023-12-18 DIAGNOSIS — E66.812 CLASS 2 OBESITY DUE TO EXCESS CALORIES WITH BODY MASS INDEX (BMI) OF 35.0 TO 35.9 IN ADULT, UNSPECIFIED WHETHER SERIOUS COMORBIDITY PRESENT: ICD-10-CM

## 2023-12-18 DIAGNOSIS — E66.09 CLASS 2 OBESITY DUE TO EXCESS CALORIES WITH BODY MASS INDEX (BMI) OF 35.0 TO 35.9 IN ADULT, UNSPECIFIED WHETHER SERIOUS COMORBIDITY PRESENT: ICD-10-CM

## 2023-12-18 DIAGNOSIS — Z86.39 HISTORY OF HYPERGLYCEMIA: ICD-10-CM

## 2023-12-18 DIAGNOSIS — D72.829 LEUKOCYTOSIS, UNSPECIFIED TYPE: ICD-10-CM

## 2023-12-18 DIAGNOSIS — Z01.818 PREOP GENERAL PHYSICAL EXAM: Primary | ICD-10-CM

## 2023-12-18 DIAGNOSIS — H25.9 AGE-RELATED CATARACT OF BOTH EYES, UNSPECIFIED AGE-RELATED CATARACT TYPE: ICD-10-CM

## 2023-12-18 PROBLEM — R11.0 NAUSEA: Status: RESOLVED | Noted: 2021-12-26 | Resolved: 2023-12-18

## 2023-12-18 LAB
ANION GAP SERPL CALCULATED.3IONS-SCNC: 8 MMOL/L (ref 7–15)
BASOPHILS # BLD AUTO: 0 10E3/UL (ref 0–0.2)
BASOPHILS NFR BLD AUTO: 0 %
BUN SERPL-MCNC: 18.8 MG/DL (ref 8–23)
CALCIUM SERPL-MCNC: 8.8 MG/DL (ref 8.8–10.2)
CHLORIDE SERPL-SCNC: 106 MMOL/L (ref 98–107)
CHOLEST SERPL-MCNC: 227 MG/DL
CREAT SERPL-MCNC: 0.8 MG/DL (ref 0.51–0.95)
DEPRECATED HCO3 PLAS-SCNC: 25 MMOL/L (ref 22–29)
EGFRCR SERPLBLD CKD-EPI 2021: 76 ML/MIN/1.73M2
EOSINOPHIL # BLD AUTO: 0.2 10E3/UL (ref 0–0.7)
EOSINOPHIL NFR BLD AUTO: 2 %
ERYTHROCYTE [DISTWIDTH] IN BLOOD BY AUTOMATED COUNT: 12.3 % (ref 10–15)
FASTING STATUS PATIENT QL REPORTED: NO
GLUCOSE SERPL-MCNC: 96 MG/DL (ref 70–99)
HBA1C MFR BLD: 5.9 % (ref 0–5.6)
HCT VFR BLD AUTO: 41.7 % (ref 35–47)
HDLC SERPL-MCNC: 52 MG/DL
HGB BLD-MCNC: 13.6 G/DL (ref 11.7–15.7)
IMM GRANULOCYTES # BLD: 0 10E3/UL
IMM GRANULOCYTES NFR BLD: 0 %
LDLC SERPL CALC-MCNC: 150 MG/DL
LYMPHOCYTES # BLD AUTO: 2.2 10E3/UL (ref 0.8–5.3)
LYMPHOCYTES NFR BLD AUTO: 24 %
MCH RBC QN AUTO: 29.9 PG (ref 26.5–33)
MCHC RBC AUTO-ENTMCNC: 32.6 G/DL (ref 31.5–36.5)
MCV RBC AUTO: 92 FL (ref 78–100)
MONOCYTES # BLD AUTO: 0.5 10E3/UL (ref 0–1.3)
MONOCYTES NFR BLD AUTO: 6 %
NEUTROPHILS # BLD AUTO: 6.3 10E3/UL (ref 1.6–8.3)
NEUTROPHILS NFR BLD AUTO: 68 %
NONHDLC SERPL-MCNC: 175 MG/DL
PLATELET # BLD AUTO: 294 10E3/UL (ref 150–450)
POTASSIUM SERPL-SCNC: 4.6 MMOL/L (ref 3.4–5.3)
RBC # BLD AUTO: 4.55 10E6/UL (ref 3.8–5.2)
SODIUM SERPL-SCNC: 139 MMOL/L (ref 135–145)
TRIGL SERPL-MCNC: 124 MG/DL
WBC # BLD AUTO: 9.4 10E3/UL (ref 4–11)

## 2023-12-18 PROCEDURE — 80048 BASIC METABOLIC PNL TOTAL CA: CPT | Performed by: NURSE PRACTITIONER

## 2023-12-18 PROCEDURE — 85025 COMPLETE CBC W/AUTO DIFF WBC: CPT | Performed by: NURSE PRACTITIONER

## 2023-12-18 PROCEDURE — 99204 OFFICE O/P NEW MOD 45 MIN: CPT | Performed by: NURSE PRACTITIONER

## 2023-12-18 PROCEDURE — 80061 LIPID PANEL: CPT | Performed by: NURSE PRACTITIONER

## 2023-12-18 PROCEDURE — 83036 HEMOGLOBIN GLYCOSYLATED A1C: CPT | Performed by: NURSE PRACTITIONER

## 2023-12-18 PROCEDURE — 36415 COLL VENOUS BLD VENIPUNCTURE: CPT | Performed by: NURSE PRACTITIONER

## 2023-12-18 RX ORDER — RESPIRATORY SYNCYTIAL VIRUS VACCINE 120MCG/0.5
0.5 KIT INTRAMUSCULAR ONCE
Qty: 1 EACH | Refills: 0 | Status: CANCELLED | OUTPATIENT
Start: 2023-12-18 | End: 2023-12-18

## 2023-12-18 NOTE — PROGRESS NOTES
[facility]  [address] [facility] [address]  Phone: [phone]  Fax: [phone]  Primary Provider: No Ref-Primary, Physician  Pre-op Performing Provider: HARJIT SEGOVIA      PREOPERATIVE EVALUATION:  Today's date: 12/18/2023    Yusra is a 73 year old, presenting for the following:  Pre-Op Exam (Pre-op physical on 1/04/2024 for cataracts surgery,)        12/18/2023     9:48 AM   Additional Questions   Roomed by KARRIE-LPN   Accompanied by N/A       Surgical Information:  Surgery/Procedure: cataracts surgery  Surgery Location: [facility]  Surgeon: Dr. Lora  Surgery Date: 01/04/2024, 1/18/23   Time of Surgery: TBD  Where patient plans to recover: At home with family  Fax number for surgical facility: [phone]    Assessment & Plan     The proposed surgical procedure is considered LOW risk.    Preop general physical exam  73 year old presenting with bilateral age-related cataracts requiring surgery.   -Discussed maintaining good hand hygiene practices. Avoid those who are ill and focus on eating a well balanced diet and get plenty of rest to avoid illness.   -Avoid aspirin and ibuprofen 2 weeks before surgery. Okay to use tylenol if having pain.   -Discussed the need to follow up with a PCP about future screening needs.     Plan:   - Basic metabolic panel  (Ca, Cl, CO2, Creat, Gluc, K, Na, BUN)  - CBC with platelets and differential  - Basic metabolic panel  (Ca, Cl, CO2, Creat, Gluc, K, Na, BUN)  - CBC with platelets and differential  - Follow up with me in 2 months to schedule an establish care and AWV - for discussion of preventative medicine.     Age-related cataract of both eyes, unspecified age-related cataract type  Bilateral cataracts requiring cataract surgery on January 4th     Plan:  - Follow up with optometrist for any vision concerns or additional surgical questions.      Leukocytosis, unspecified type  Hx of leukocytosis. Asymptomatic.    Addendum 1155: 12/18: WBC within defined limits     Plan:   - CBC with platelets and differential.     History of hyperglycemia  12/27/21: Hx of elevated blood glucose.   Addendum 1155: 12/18: A1C 5.9% - pre-diabetes.   Asymptomatic.     Plan:   - Hemoglobin A1c  - Hemoglobin A1c  - Follow up with PCP on lifestyle modifications at next appointment.     Class 2 obesity due to excess calories with body mass index (BMI) of 35.0 to 35.9 in adult, unspecified whether serious comorbidity present  Discussed diet and exercise routines. Continue lifestyle modifications.     Plan:   - Lipid panel reflex to direct LDL Non-fasting  - Lipid panel reflex to direct LDL Non-fasting    Screening for hyperlipidemia  Asymptomatic.     Plan:  - Lipid panel reflex to direct LDL Non-fasting  - Lipid panel reflex to direct LDL Non-fasting        - No identified additional risk factors other than previously addressed    Antiplatelet or Anticoagulation Medication Instructions:   - Patient is on no antiplatelet or anticoagulation medications.   - Bleeding risk is low for this procedure (e.g. dental, skin, cataract).    Additional Medication Instructions:  Patient is on no additional chronic medications    RECOMMENDATION:  APPROVAL GIVEN to proceed with proposed procedure - cleared for both initial and second procure following today's visit.             Subjective       HPI related to upcoming procedure:     Hazy vision - went to eye doctor, found out she had a cataract in September 2023. Legally blind in L eye, cataracts in both eyes.   Symptoms include halos in the vision, blurry vision, and watery eyes. Symptoms vary depending on bright or dim light. Denies eye pain, and redness.      Jan 4th will have R eye surgery then 2 weeks later will do the L eye.      will help after surgery.     Snore at night - feels well rested when waking up, never been told she stops breathing.         12/18/2023     9:42 AM   Preop Questions   1. Have  you ever had a heart attack or stroke? No   2. Have you ever had surgery on your heart or blood vessels, such as a stent placement, a coronary artery bypass, or surgery on an artery in your head, neck, heart, or legs? No   3. Do you have chest pain with activity? No   4. Do you have a history of  heart failure? No   5. Do you currently have a cold, bronchitis or symptoms of other infection? No   6. Do you have a cough, shortness of breath, or wheezing? No   7. Do you or anyone in your family have previous history of blood clots? No   8. Do you or does anyone in your family have a serious bleeding problem such as prolonged bleeding following surgeries or cuts? No   9. Have you ever had problems with anemia or been told to take iron pills? No   10. Have you had any abnormal blood loss such as black, tarry or bloody stools, or abnormal vaginal bleeding? No   11. Have you ever had a blood transfusion? No   12. Are you willing to have a blood transfusion if it is medically needed before, during, or after your surgery? Yes   13. Have you or any of your relatives ever had problems with anesthesia? No   14. Do you have sleep apnea, excessive snoring or daytime drowsiness? No - snores intermittently, not excessively    15. Do you have any artifical heart valves or other implanted medical devices like a pacemaker, defibrillator, or continuous glucose monitor? No   16. Do you have artificial joints? No   17. Are you allergic to latex? No       Health Care Directive:  Patient does not have a Health Care Directive or Living Will: Discussed advance care planning with patient; information given to patient to review.    Preoperative Review of :   reviewed - no record of controlled substances prescribed.          Review of Systems  CONSTITUTIONAL: NEGATIVE for fever, chills, change in weight  INTEGUMENTARY/SKIN: NEGATIVE for worrisome rashes, moles or lesions  EYES: NEGATIVE for vision changes or irritation  ENT/MOUTH:  "NEGATIVE for ear, mouth and throat problems  RESP: NEGATIVE for significant cough or SOB  CV: NEGATIVE for chest pain, palpitations or peripheral edema  GI: NEGATIVE for nausea, abdominal pain, heartburn, or change in bowel habits  : NEGATIVE for frequency, dysuria, or hematuria  MUSCULOSKELETAL: NEGATIVE for significant arthralgias or myalgia  NEURO: NEGATIVE for weakness, dizziness or paresthesias  ENDOCRINE: NEGATIVE for temperature intolerance, skin/hair changes  HEME: NEGATIVE for bleeding problems  PSYCHIATRIC: NEGATIVE for changes in mood or affect    Patient Active Problem List    Diagnosis Date Noted    Transient global amnesia 12/26/2021     Priority: Medium    Leukocytosis 12/26/2021     Priority: Medium    History of hyperglycemia 12/26/2021     Priority: Medium      Past Medical History:   Diagnosis Date    Nausea     Wrist fracture 2006    right, S/P ORIF     Past Surgical History:   Procedure Laterality Date    VAGINAL DELIVERY N/A     Vaginal delivery x 4    WRIST SURGERY  2006     No current outpatient medications on file.       No Known Allergies     Social History     Tobacco Use    Smoking status: Never    Smokeless tobacco: Not on file   Substance Use Topics    Alcohol use: Yes     Comment: < 1 drink a month       History   Drug Use Not on file         Objective     /64 (BP Location: Right arm, Patient Position: Sitting, Cuff Size: Adult Regular)   Pulse 67   Temp 97.7  F (36.5  C) (Oral)   Resp 16   Ht 1.702 m (5' 7\")   Wt 101.7 kg (224 lb 1.6 oz)   SpO2 97%   BMI 35.10 kg/m      Physical Exam    GENERAL APPEARANCE: alert, cooperative, appropriately dressed, no distress     EYES: EOMI, PERRLA, Positive red light reflex      HENT: ear canals and TM's normal and nose and mouth without ulcers or lesions     NECK: no adenopathy, no asymmetry, masses, or scars and thyroid normal to palpation     RESP: lungs clear to auscultation - no rales, rhonchi or wheezes     CV: regular rates and " rhythm, normal S1 S2, no S3 or S4 and no murmur, click or rub. Trace non-pitting edema in BLE.      ABDOMEN:  soft, nontender, no HSM or masses and bowel sounds normal     MS: extremities normal- no gross deformities noted, no evidence of inflammation in joints, FROM in all extremities.     SKIN: no suspicious lesions or rashes     NEURO: Normal strength and tone, sensory exam grossly normal, mentation intact and speech normal     PSYCH: mentation appears normal. and affect normal/bright     LYMPHATICS: No cervical adenopathy    Recent Labs   Lab Test 12/27/21  0732 12/26/21  0735 12/26/21  0715 12/26/21  0714   HGB 13.1  --   --  12.9     --   --  288   INR  --   --  1.10  --     141  --   --    POTASSIUM 4.1 4.5  --   --    CR 0.73 0.73  --   --           Diagnostics:  Results for orders placed or performed in visit on 12/18/23   Hemoglobin A1c     Status: Abnormal   Result Value Ref Range    Hemoglobin A1C 5.9 (H) 0.0 - 5.6 %   CBC with platelets and differential     Status: None   Result Value Ref Range    WBC Count 9.4 4.0 - 11.0 10e3/uL    RBC Count 4.55 3.80 - 5.20 10e6/uL    Hemoglobin 13.6 11.7 - 15.7 g/dL    Hematocrit 41.7 35.0 - 47.0 %    MCV 92 78 - 100 fL    MCH 29.9 26.5 - 33.0 pg    MCHC 32.6 31.5 - 36.5 g/dL    RDW 12.3 10.0 - 15.0 %    Platelet Count 294 150 - 450 10e3/uL    % Neutrophils 68 %    % Lymphocytes 24 %    % Monocytes 6 %    % Eosinophils 2 %    % Basophils 0 %    % Immature Granulocytes 0 %    Absolute Neutrophils 6.3 1.6 - 8.3 10e3/uL    Absolute Lymphocytes 2.2 0.8 - 5.3 10e3/uL    Absolute Monocytes 0.5 0.0 - 1.3 10e3/uL    Absolute Eosinophils 0.2 0.0 - 0.7 10e3/uL    Absolute Basophils 0.0 0.0 - 0.2 10e3/uL    Absolute Immature Granulocytes 0.0 <=0.4 10e3/uL   CBC with platelets and differential     Status: None    Narrative    The following orders were created for panel order CBC with platelets and differential.  Procedure                               Abnormality          Status                     ---------                               -----------         ------                     CBC with platelets and d...[[id number]]                      Final result                 Please view results for these tests on the individual orders.       No EKG required for low risk surgery (cataract, skin procedure, breast biopsy, etc).    Revised Cardiac Risk Index (RCRI):  The patient has the following serious cardiovascular risks for perioperative complications:   - No serious cardiac risks = 0 points     RCRI Interpretation: 0 points: Class I (very low risk - 0.4% complication rate)       Signed Electronically by: VINEET Abad CNP  Copy of this evaluation report is provided to requesting physician.

## 2023-12-18 NOTE — PATIENT INSTRUCTIONS
Preparing for Your Surgery  Getting started  A nurse will call you to review your health history and instructions. They will give you an arrival time based on your scheduled surgery time. Please be ready to share:  Your doctor's clinic name and phone number  Your medical, surgical, and anesthesia history  A list of allergies and sensitivities  A list of medicines, including herbal treatments and over-the-counter drugs  Whether the patient has a legal guardian (ask how to send us the papers in advance)  Please tell us if you're pregnant--or if there's any chance you might be pregnant. Some surgeries may injure a fetus (unborn baby), so they require a pregnancy test. Surgeries that are safe for a fetus don't always need a test, and you can choose whether to have one.   If you have a child who's having surgery, please ask for a copy of Preparing for Your Child's Surgery.    Preparing for surgery  Within 10 to 30 days of surgery: Have a pre-op exam (sometimes called an H&P, or History and Physical). This can be done at a clinic or pre-operative center.  If you're having a , you may not need this exam. Talk to your care team.  At your pre-op exam, talk to your care team about all medicines you take. If you need to stop any medicines before surgery, ask when to start taking them again.  We do this for your safety. Many medicines can make you bleed too much during surgery. Some change how well surgery (anesthesia) drugs work.  Call your insurance company to let them know you're having surgery. (If you don't have insurance, call [phone].)  Call your clinic if there's any change in your health. This includes signs of a cold or flu (sore throat, runny nose, cough, rash, fever). It also includes a scrape or scratch near the surgery site.  If you have questions on the day of surgery, call your hospital or surgery center.  Eating and drinking guidelines  For your safety: Unless your surgeon tells you otherwise,  follow the guidelines below.  Eat and drink as usual until 8 hours before you arrive for surgery. After that, no food or milk.  Drink clear liquids until 2 hours before you arrive. These are liquids you can see through, like water, Gatorade, and Propel Water. They also include plain black coffee and tea (no cream or milk), candy, and breath mints. You can spit out gum when you arrive.  If you drink alcohol: Stop drinking it the night before surgery.  If your care team tells you to take medicine on the morning of surgery, it's okay to take it with a sip of water.  Preventing infection  Shower or bathe the night before and morning of your surgery. Follow the instructions your clinic gave you. (If no instructions, use regular soap.)  Don't shave or clip hair near your surgery site. We'll remove the hair if needed.  Don't smoke or vape the morning of surgery. You may chew nicotine gum up to 2 hours before surgery. A nicotine patch is okay.  Note: Some surgeries require you to completely quit smoking and nicotine. Check with your surgeon.  Your care team will make every effort to keep you safe from infection. We will:  Clean our hands often with soap and water (or an alcohol-based hand rub).  Clean the skin at your surgery site with a special soap that kills germs.  Give you a special gown to keep you warm. (Cold raises the risk of infection.)  Wear special hair covers, masks, gowns and gloves during surgery.  Give antibiotic medicine, if prescribed. Not all surgeries need antibiotics.  What to bring on the day of surgery  Photo ID and insurance card  Copy of your health care directive, if you have one  Glasses and hearing aids (bring cases)  You can't wear contacts during surgery  Inhaler and eye drops, if you use them (tell us about these when you arrive)  CPAP machine or breathing device, if you use them  A few personal items, if spending the night  If you have . . .  A pacemaker, ICD (cardiac defibrillator) or other  implant: Bring the ID card.  An implanted stimulator: Bring the remote control.  A legal guardian: Bring a copy of the certified (court-stamped) guardianship papers.  Please remove any jewelry, including body piercings. Leave jewelry and other valuables at home.  If you're going home the day of surgery  You must have a responsible adult drive you home. They should stay with you overnight as well.  If you don't have someone to stay with you, and you aren't safe to go home alone, we may keep you overnight. Insurance often won't pay for this.  After surgery  If it's hard to control your pain or you need more pain medicine, please call your surgeon's office.  Questions?   If you have any questions for your care team, list them here: _________________________________________________________________________________________________________________________________________________________________________ ____________________________________ ____________________________________ ____________________________________  For informational purposes only. Not to replace the advice of your health care provider. Copyright   2003, 2019 [facility]. All rights reserved. Clinically reviewed by Gabby Benites MD. [employer] 149358 - REV 12/22.

## 2023-12-19 NOTE — RESULT ENCOUNTER NOTE
Cleared for procedure     Will discuss below at follow up as discussed in clinic today.  Your basic metabolic panel shows your kidney function and electrolytes (sodium and potassium) were normal.    Your screen for diabetes does show some impaired glucose tolerance that we call prediabetes. Will discuss at follow up     Non fasting labs  The 10-year ASCVD risk score (Tristen RIVERA, et al., 2019) is: 18.1%    Values used to calculate the score:      Age: 73 years      Sex: Female      Is Non- : No      Diabetic: No      Tobacco smoker: No      Systolic Blood Pressure: 136 mmHg      Is BP treated: No      HDL Cholesterol: 52 mg/dL      Total Cholesterol: 227 mg/dL

## 2024-06-17 ENCOUNTER — APPOINTMENT (OUTPATIENT)
Dept: CARDIOLOGY | Facility: CLINIC | Age: 74
End: 2024-06-17
Attending: STUDENT IN AN ORGANIZED HEALTH CARE EDUCATION/TRAINING PROGRAM
Payer: COMMERCIAL

## 2024-06-17 ENCOUNTER — APPOINTMENT (OUTPATIENT)
Dept: CT IMAGING | Facility: CLINIC | Age: 74
End: 2024-06-17
Attending: STUDENT IN AN ORGANIZED HEALTH CARE EDUCATION/TRAINING PROGRAM
Payer: COMMERCIAL

## 2024-06-17 ENCOUNTER — HOSPITAL ENCOUNTER (OUTPATIENT)
Facility: CLINIC | Age: 74
Setting detail: OBSERVATION
Discharge: HOME | End: 2024-06-18
Attending: STUDENT IN AN ORGANIZED HEALTH CARE EDUCATION/TRAINING PROGRAM | Admitting: STUDENT IN AN ORGANIZED HEALTH CARE EDUCATION/TRAINING PROGRAM
Payer: COMMERCIAL

## 2024-06-17 DIAGNOSIS — I48.91 NEW ONSET ATRIAL FIBRILLATION (H): ICD-10-CM

## 2024-06-17 DIAGNOSIS — I26.94 MULTIPLE SUBSEGMENTAL PULMONARY EMBOLI WITHOUT ACUTE COR PULMONALE (H): ICD-10-CM

## 2024-06-17 LAB
ANION GAP SERPL CALCULATED.3IONS-SCNC: 11 MMOL/L (ref 7–15)
ATRIAL RATE - MUSE: 136 BPM
ATRIAL RATE - MUSE: 375 BPM
BUN SERPL-MCNC: 21.7 MG/DL (ref 8–23)
CALCIUM SERPL-MCNC: 9 MG/DL (ref 8.8–10.2)
CHLORIDE SERPL-SCNC: 106 MMOL/L (ref 98–107)
CREAT SERPL-MCNC: 0.87 MG/DL (ref 0.51–0.95)
D DIMER PPP FEU-MCNC: 0.63 UG/ML FEU (ref 0–0.5)
DEPRECATED HCO3 PLAS-SCNC: 23 MMOL/L (ref 22–29)
DIASTOLIC BLOOD PRESSURE - MUSE: 69 MMHG
DIASTOLIC BLOOD PRESSURE - MUSE: 70 MMHG
EGFRCR SERPLBLD CKD-EPI 2021: 69 ML/MIN/1.73M2
ERYTHROCYTE [DISTWIDTH] IN BLOOD BY AUTOMATED COUNT: 12.7 % (ref 10–15)
GLUCOSE SERPL-MCNC: 128 MG/DL (ref 70–99)
HCT VFR BLD AUTO: 40.9 % (ref 35–47)
HGB BLD-MCNC: 13.5 G/DL (ref 11.7–15.7)
INTERPRETATION ECG - MUSE: NORMAL
INTERPRETATION ECG - MUSE: NORMAL
LVEF ECHO: NORMAL
MCH RBC QN AUTO: 29.3 PG (ref 26.5–33)
MCHC RBC AUTO-ENTMCNC: 33 G/DL (ref 31.5–36.5)
MCV RBC AUTO: 89 FL (ref 78–100)
NT-PROBNP SERPL-MCNC: 879 PG/ML (ref 0–900)
P AXIS - MUSE: NORMAL DEGREES
P AXIS - MUSE: NORMAL DEGREES
PLATELET # BLD AUTO: 270 10E3/UL (ref 150–450)
POTASSIUM SERPL-SCNC: 4.5 MMOL/L (ref 3.4–5.3)
PR INTERVAL - MUSE: NORMAL MS
PR INTERVAL - MUSE: NORMAL MS
QRS DURATION - MUSE: 82 MS
QRS DURATION - MUSE: 86 MS
QT - MUSE: 364 MS
QT - MUSE: 382 MS
QTC - MUSE: 432 MS
QTC - MUSE: 440 MS
R AXIS - MUSE: 30 DEGREES
R AXIS - MUSE: 34 DEGREES
RADIOLOGIST FLAGS: ABNORMAL
RBC # BLD AUTO: 4.6 10E6/UL (ref 3.8–5.2)
SODIUM SERPL-SCNC: 140 MMOL/L (ref 135–145)
SYSTOLIC BLOOD PRESSURE - MUSE: 149 MMHG
SYSTOLIC BLOOD PRESSURE - MUSE: 167 MMHG
T AXIS - MUSE: 183 DEGREES
T AXIS - MUSE: 195 DEGREES
TROPONIN T SERPL HS-MCNC: 10 NG/L
TROPONIN T SERPL HS-MCNC: 10 NG/L
TSH SERPL DL<=0.005 MIU/L-ACNC: 3.38 UIU/ML (ref 0.3–4.2)
VENTRICULAR RATE- MUSE: 77 BPM
VENTRICULAR RATE- MUSE: 88 BPM
WBC # BLD AUTO: 10.7 10E3/UL (ref 4–11)

## 2024-06-17 PROCEDURE — 99222 1ST HOSP IP/OBS MODERATE 55: CPT | Performed by: STUDENT IN AN ORGANIZED HEALTH CARE EDUCATION/TRAINING PROGRAM

## 2024-06-17 PROCEDURE — 120N000004 HC R&B MS OVERFLOW

## 2024-06-17 PROCEDURE — 250N000013 HC RX MED GY IP 250 OP 250 PS 637: Performed by: INTERNAL MEDICINE

## 2024-06-17 PROCEDURE — 93005 ELECTROCARDIOGRAM TRACING: CPT | Performed by: STUDENT IN AN ORGANIZED HEALTH CARE EDUCATION/TRAINING PROGRAM

## 2024-06-17 PROCEDURE — 255N000002 HC RX 255 OP 636: Performed by: STUDENT IN AN ORGANIZED HEALTH CARE EDUCATION/TRAINING PROGRAM

## 2024-06-17 PROCEDURE — 36415 COLL VENOUS BLD VENIPUNCTURE: CPT | Performed by: STUDENT IN AN ORGANIZED HEALTH CARE EDUCATION/TRAINING PROGRAM

## 2024-06-17 PROCEDURE — 71275 CT ANGIOGRAPHY CHEST: CPT

## 2024-06-17 PROCEDURE — 83880 ASSAY OF NATRIURETIC PEPTIDE: CPT | Performed by: STUDENT IN AN ORGANIZED HEALTH CARE EDUCATION/TRAINING PROGRAM

## 2024-06-17 PROCEDURE — 99291 CRITICAL CARE FIRST HOUR: CPT | Mod: 25

## 2024-06-17 PROCEDURE — 85379 FIBRIN DEGRADATION QUANT: CPT | Performed by: STUDENT IN AN ORGANIZED HEALTH CARE EDUCATION/TRAINING PROGRAM

## 2024-06-17 PROCEDURE — 84443 ASSAY THYROID STIM HORMONE: CPT | Performed by: STUDENT IN AN ORGANIZED HEALTH CARE EDUCATION/TRAINING PROGRAM

## 2024-06-17 PROCEDURE — 93005 ELECTROCARDIOGRAM TRACING: CPT | Performed by: EMERGENCY MEDICINE

## 2024-06-17 PROCEDURE — 250N000011 HC RX IP 250 OP 636: Mod: JZ | Performed by: STUDENT IN AN ORGANIZED HEALTH CARE EDUCATION/TRAINING PROGRAM

## 2024-06-17 PROCEDURE — 93306 TTE W/DOPPLER COMPLETE: CPT | Mod: 26 | Performed by: GENERAL ACUTE CARE HOSPITAL

## 2024-06-17 PROCEDURE — 96372 THER/PROPH/DIAG INJ SC/IM: CPT | Performed by: STUDENT IN AN ORGANIZED HEALTH CARE EDUCATION/TRAINING PROGRAM

## 2024-06-17 PROCEDURE — 84484 ASSAY OF TROPONIN QUANT: CPT | Performed by: STUDENT IN AN ORGANIZED HEALTH CARE EDUCATION/TRAINING PROGRAM

## 2024-06-17 PROCEDURE — 250N000013 HC RX MED GY IP 250 OP 250 PS 637: Performed by: STUDENT IN AN ORGANIZED HEALTH CARE EDUCATION/TRAINING PROGRAM

## 2024-06-17 PROCEDURE — 999N000208 ECHOCARDIOGRAM COMPLETE

## 2024-06-17 PROCEDURE — 85027 COMPLETE CBC AUTOMATED: CPT | Performed by: STUDENT IN AN ORGANIZED HEALTH CARE EDUCATION/TRAINING PROGRAM

## 2024-06-17 PROCEDURE — 80048 BASIC METABOLIC PNL TOTAL CA: CPT | Performed by: STUDENT IN AN ORGANIZED HEALTH CARE EDUCATION/TRAINING PROGRAM

## 2024-06-17 RX ORDER — ACETAMINOPHEN 325 MG/1
650 TABLET ORAL EVERY 4 HOURS PRN
Status: DISCONTINUED | OUTPATIENT
Start: 2024-06-17 | End: 2024-06-18 | Stop reason: HOSPADM

## 2024-06-17 RX ORDER — LANOLIN ALCOHOL/MO/W.PET/CERES
3 CREAM (GRAM) TOPICAL
Status: DISCONTINUED | OUTPATIENT
Start: 2024-06-17 | End: 2024-06-18 | Stop reason: HOSPADM

## 2024-06-17 RX ORDER — IOPAMIDOL 755 MG/ML
100 INJECTION, SOLUTION INTRAVASCULAR ONCE
Status: COMPLETED | OUTPATIENT
Start: 2024-06-17 | End: 2024-06-17

## 2024-06-17 RX ORDER — ACETAMINOPHEN 650 MG/1
650 SUPPOSITORY RECTAL EVERY 4 HOURS PRN
Status: DISCONTINUED | OUTPATIENT
Start: 2024-06-17 | End: 2024-06-18 | Stop reason: HOSPADM

## 2024-06-17 RX ORDER — ENOXAPARIN SODIUM 100 MG/ML
1 INJECTION SUBCUTANEOUS EVERY 12 HOURS
Status: DISCONTINUED | OUTPATIENT
Start: 2024-06-17 | End: 2024-06-17

## 2024-06-17 RX ORDER — AMOXICILLIN 250 MG
2 CAPSULE ORAL 2 TIMES DAILY PRN
Status: DISCONTINUED | OUTPATIENT
Start: 2024-06-17 | End: 2024-06-18 | Stop reason: HOSPADM

## 2024-06-17 RX ORDER — ONDANSETRON 4 MG/1
4 TABLET, ORALLY DISINTEGRATING ORAL EVERY 6 HOURS PRN
Status: DISCONTINUED | OUTPATIENT
Start: 2024-06-17 | End: 2024-06-18 | Stop reason: HOSPADM

## 2024-06-17 RX ORDER — ONDANSETRON 2 MG/ML
4 INJECTION INTRAMUSCULAR; INTRAVENOUS EVERY 6 HOURS PRN
Status: DISCONTINUED | OUTPATIENT
Start: 2024-06-17 | End: 2024-06-18 | Stop reason: HOSPADM

## 2024-06-17 RX ORDER — AMOXICILLIN 250 MG
1 CAPSULE ORAL 2 TIMES DAILY PRN
Status: DISCONTINUED | OUTPATIENT
Start: 2024-06-17 | End: 2024-06-18 | Stop reason: HOSPADM

## 2024-06-17 RX ADMIN — ACETAMINOPHEN 650 MG: 325 TABLET ORAL at 16:59

## 2024-06-17 RX ADMIN — ENOXAPARIN SODIUM 100 MG: 100 INJECTION SUBCUTANEOUS at 08:04

## 2024-06-17 RX ADMIN — Medication 3 MG: at 21:38

## 2024-06-17 RX ADMIN — PERFLUTREN 3 ML: 6.52 INJECTION, SUSPENSION INTRAVENOUS at 15:42

## 2024-06-17 RX ADMIN — APIXABAN 10 MG: 5 TABLET, FILM COATED ORAL at 20:10

## 2024-06-17 RX ADMIN — IOPAMIDOL 100 ML: 755 INJECTION, SOLUTION INTRAVENOUS at 06:42

## 2024-06-17 ASSESSMENT — ACTIVITIES OF DAILY LIVING (ADL)
ADLS_ACUITY_SCORE: 36
ADLS_ACUITY_SCORE: 22
ADLS_ACUITY_SCORE: 36
ADLS_ACUITY_SCORE: 36
ADLS_ACUITY_SCORE: 23
ADLS_ACUITY_SCORE: 23
ADLS_ACUITY_SCORE: 36
ADLS_ACUITY_SCORE: 23
ADLS_ACUITY_SCORE: 23
ADLS_ACUITY_SCORE: 36
ADLS_ACUITY_SCORE: 23
ADLS_ACUITY_SCORE: 36

## 2024-06-17 ASSESSMENT — COLUMBIA-SUICIDE SEVERITY RATING SCALE - C-SSRS
1. IN THE PAST MONTH, HAVE YOU WISHED YOU WERE DEAD OR WISHED YOU COULD GO TO SLEEP AND NOT WAKE UP?: NO
6. HAVE YOU EVER DONE ANYTHING, STARTED TO DO ANYTHING, OR PREPARED TO DO ANYTHING TO END YOUR LIFE?: NO
2. HAVE YOU ACTUALLY HAD ANY THOUGHTS OF KILLING YOURSELF IN THE PAST MONTH?: NO

## 2024-06-17 NOTE — PROVIDER NOTIFICATION
Notified MD at 1648 regarding changes in vital signs.      Spoke with: Dr. Olmos     Orders were obtained.    Comments: Notified provider of elevated BP, pt reports feeling stressed out and having headache. Tylenol ordered and given.

## 2024-06-17 NOTE — CONSULTS
6/17  Both Eliquis and Xarelto are covered $60 for 30 days supply,  Medicare D plan so ineligible to use copay assistance cards due to laws.     Thank you for allowing me to help with your patient  Celeste Alexander [facility]  Pharmacy Discharge Liaison [facility]

## 2024-06-17 NOTE — ED PROVIDER NOTES
Emergency Department Encounter         FINAL IMPRESSION:  New onset A-fib, PE        ED COURSE AND MEDICAL DECISION MAKING       ED Course as of 06/27/24 2253   Mon Jun 17, 2024   0586 Patient is a morbidly obese 73-year-old here with pleuritic pain for last 24 hours.  No hemoptysis, leg swelling or significant chest discomfort or dyspnea on exertion of the last week or so.  No abdominal pain, nausea vomiting fevers chills cough or congestion.  No dysuria or bowel movement changes.  On arrival she looks well.  Vitals are stable.  EKG does suggest A-fib/a flutter.  She otherwise looks well clinically.  Heart and lungs are normal.  Abdomen benign.  No leg swelling.  Plan for cardiopulmonary evaluation as well as D-dimer and reevaluate.   - plan for admit  - new PE without heart strain    Additional ED Course Timeline:  5:21 AM I met with the patient, obtained history, performed an initial exam, and discussed options and plan for diagnostics and treatment here in the ED.                        Medical Decision Making  Obtained supplemental history:Supplemental history obtained?: No  Reviewed external records: External records reviewed?: Documented in chart  Care impacted by chronic illness:N/A  Care significantly affected by social determinants of health:Access to Medical Care  Did you consider but not order tests?: Work up considered but not performed and documented in chart, if applicable  Did you interpret images independently?: Independent interpretation of ECG and images noted in documentation, when applicable.  Consultation discussion with other provider:Did you involve another provider (consultant, MH, pharmacy, etc.)?: No  Admit.              EKG        Critical Care     Performed by: Vaughn Anna or    Authorized by: Vaughn Anna  Total critical care time:  minutes  Critical care was necessary to treat or prevent imminent or life-threatening deterioration of the following conditions:   Critical care was time spent  personally by me on the following activities: development of treatment plan with patient or surrogate, discussions with consultants, examination of patient, evaluation of patient's response to treatment, obtaining history from patient or surrogate, ordering and performing treatments and interventions, ordering and review of laboratory studies, ordering and review of radiographic studies, re-evaluation of patient's condition and monitoring for potential decompensation.  Critical care time was exclusive of separately billable procedures and treating other patients.'    At the conclusion of the encounter I discussed the results of all the tests and the disposition. The questions were answered. The patient or family acknowledged understanding and was agreeable with the care plan.                  MEDICATIONS GIVEN IN THE EMERGENCY DEPARTMENT:  Medications - No data to display    NEW PRESCRIPTIONS STARTED AT TODAY'S ED VISIT:  New Prescriptions    No medications on file       HPI     Patient information obtained from: The patient    Use of : N/A     Yusra Rangel is a 73 year old female with no pertinent history who presents to this ED via walk-in for evaluation of chest pain.    The patient developed left-sided chest pain yesterday morning. She is complaining of left neck pain and intermittent left arm pain with her chest pain. Her pain exacerbates with deep breaths. Denies history of a-fib, hypertension, diabetes. No new medications. Normal bowel/bladder habits. No recent surgeries.     Otherwise, the patient denied having leg swelling, cough, congestion, shortness of breath, and any other medical complaints at this time.          REVIEW OF SYSTEMS:  Review of Systems   Constitutional: Negative for fever, malaise  HEENT: Negative runny nose, sore throat, ear pain, neck pain  Respiratory: Negative for shortness of breath, cough, congestion  Cardiovascular: Negative for leg edema. Positive for left-sided  "chest pain that radiates to left neck and left upper extremity.  Gastrointestinal: Negative for abdominal distention, abdominal pain, constipation, vomiting, nausea, diarrhea  Genitourinary: Negative for dysuria and hematuria.   Integument: Negative for rash, skin breakdown  Neurological: Negative for paresthesias, weakness, headache.  Musculoskeletal: Negative for joint pain, joint swelling      All other systems reviewed and are negative.          MEDICAL HISTORY     Past Medical History:   Diagnosis Date    Nausea     Wrist fracture 2006       Past Surgical History:   Procedure Laterality Date    VAGINAL DELIVERY N/A     Vaginal delivery x 4    WRIST SURGERY  2006       Social History     Tobacco Use    Smoking status: Never   Substance Use Topics    Alcohol use: Yes     Comment: < 1 drink a month       No current outpatient medications on file.          PHYSICAL EXAM     BP (!) 149/70   Pulse 97   Resp 20   Ht 1.702 m (5' 7\")   Wt 99.8 kg (220 lb)   SpO2 96%   BMI 34.46 kg/m        PHYSICAL EXAM:     General: Patient appears well, nontoxic, comfortable  HEENT: Moist mucous membranes,  No head trauma.    Cardiovascular: Normal rate, normal rhythm, no extremity edema.  No appreciable murmur.  Respiratory: No signs of respiratory distress, lungs are clear to auscultation bilaterally with no wheezes rhonchi or rales.  Abdominal: Soft, nontender, nondistended, no palpable masses, no guarding, no rebound  Musculoskeletal: Full range of motion of joints, no deformities appreciated.  Neurological: Alert and oriented, grossly neurologically intact.  Psychological: Normal affect and mood.  Integument: No rashes appreciated          RESULTS       Labs Ordered and Resulted from Time of ED Arrival to Time of ED Departure - No data to display    No orders to display                     PROCEDURES:  Procedures:  Suleiman Donohue am serving as a scribe to document services personally performed by Vaughn Anna, " , based on my observations and the provider's statements to me.  I, Vaughn Anna DO, attest that Suleiman Chua is acting in a scribe capacity, has observed my performance of the services and has documented them in accordance with my direction.    Vaughn Anna DO  Emergency Medicine  [facility] EMERGENCY ROOM       Wilfrido, Vaughn Hanna DO  06/27/24 8894

## 2024-06-17 NOTE — ED TRIAGE NOTES
Patient presents to the ED complaining of generalized chest pain anteriorly, it radiates down to her left hip.  She states the pain is worse with deep breaths.  Denies nausea, vomiting, and injury.  No cardiac history.       Triage Assessment (Adult)       Row Name 06/17/24 0513          Triage Assessment    Airway WDL WDL        Respiratory WDL    Respiratory WDL WDL        Skin Circulation/Temperature WDL    Skin Circulation/Temperature WDL WDL        Cardiac WDL    Cardiac WDL X;chest pain        Chest Pain Assessment    Chest Pain Location anterior chest, right;anterior chest, left;midsternal     Chest Pain Intervention 12-lead ECG obtained;cardiac monitor placed        Peripheral/Neurovascular WDL    Peripheral Neurovascular WDL WDL        Cognitive/Neuro/Behavioral WDL    Cognitive/Neuro/Behavioral WDL WDL

## 2024-06-17 NOTE — H&P
"[facility]    History and Physical - Hospitalist Service       Date of Admission:  6/17/2024    Assessment & Plan      Yusra Rangel is a 73 year old female admitted on 6/17/2024.  She has no known past medical history who presented to the hospital with pleuritic pain on the left side.    Newly diagnosed unprovoked pulmonary embolism  -No clear risk factors of pulm embolism.  -Presented with 1 day of intermittent pleuritic chest pain on the left side.  -CT PE showed small subsegmental PE on the left lower lung.  -Levels are stable.  -Started on Lovenox.    Plan  -Stop Lovenox  -Transition to Eliquis 10 mg twice daily for 7 days starting tonight, then 5 mg twice daily indefinitely.   -Follow up with primary care physician for further evaluation of blood disorder as an outpatient.    Newly diagnosed atrial fibrillation  -New onset atrial fibrillation  -Heart rate is currently around 70    Plan  -Check TSH  -Transthoracic echocardiogram  -Keep patient on telemetry    Elevated blood pressure  -Is not on any medications at home.  -The patient, her usual blood pressure runs around 110/70.    Plan  -Continue to monitor                Diet: Regular Diet Adult    DVT Prophylaxis: DOAC  Alexander Catheter: Not present  Lines: None     Cardiac Monitoring: None  Code Status:  Full code    Clinically Significant Risk Factors Present on Admission                              # Obesity: Estimated body mass index is 34.46 kg/m  as calculated from the following:    Height as of this encounter: 1.702 m (5' 7\").    Weight as of this encounter: 99.8 kg (220 lb).              Disposition Plan     Medically Ready for Discharge: Anticipated Tomorrow           LAURI GONZÁLES MD  Hospitalist Service  [facility]  Securely message with Lyudmila (more info)  Text page via [facility] Paging/Directory     ______________________________________________________________________    Chief Complaint   Left " lower chest pain  History is obtained from the patient    History of Present Illness   Yusra Rangel is a 73 year old female admitted on 6/17/2024.  She has no known past medical history who presented to the hospital with pleuritic pain on the left side.    Patient was in her usual state of health until 6/16.  She woke up in the morning when she noticed chest pain on the left side that is worse with taking deep breath.  She denies any chest pressure, significant shortness of breath, heart palpitation, lightheadedness, nausea, vomiting, fever.  She denies any sick contact, recent travel, over-the-counter medication.  She denies any illicit drug use, alcohol or tobacco use.  She denies any family history of blood disorder or clots.    Vitals on presentation: Blood pressure around 150/100, heart rate around 80, SpO2 around 95% on room air BMP largely unremarkable.  CBC unremarkable.  D-dimer mildly elevated 0.63,.  CT PE-->Small pulmonary artery embolus burden to segmental and subsegmental branches of the left lower lobe as well as subsegmental branches of the right lower lobe. No right heart strain.   2.  Scattered areas of groundglass / nodular opacity and interlobular septal thickening, along with small left and trace right pleural effusions, findings suggestive of pulmonary edema.      Past Medical History    Past Medical History:   Diagnosis Date    Nausea     Wrist fracture 2006    right, S/P ORIF       Past Surgical History   Past Surgical History:   Procedure Laterality Date    VAGINAL DELIVERY N/A     Vaginal delivery x 4    WRIST SURGERY  2006       Prior to Admission Medications   None          Physical Exam   Vital Signs: Temp: 98.7  F (37.1  C) Temp src: Oral BP: (!) 154/72 Pulse: 79   Resp: 20 SpO2: 94 % O2 Device: None (Room air)    Weight: 220 lbs 0 oz    Physical Exam  Constitutional:       General: She is not in acute distress.     Appearance: She is not ill-appearing or toxic-appearing.    Cardiovascular:      Rate and Rhythm: Normal rate.      Comments: Split second heart sound.  Pulmonary:      Effort: Pulmonary effort is normal. No respiratory distress.      Breath sounds: No wheezing.   Skin:     General: Skin is warm and dry.   Neurological:      Mental Status: She is alert.   Psychiatric:         Mood and Affect: Mood normal.         Thought Content: Thought content normal.         Judgment: Judgment normal.          Medical Decision Making       60 MINUTES SPENT BY ME on the date of service doing chart review, history, exam, documentation & further activities per the note.      Data     I have personally reviewed the following data over the past 24 hrs:    10.7  \   13.5   / 270     140 106 21.7 /  128 (H)   4.5 23 0.87 \     Trop: 10 BNP: 879     INR:  N/A PTT:  N/A   D-dimer:  0.63 (H) Fibrinogen:  N/A       Imaging results reviewed over the past 24 hrs:   Recent Results (from the past 24 hour(s))   CT Chest Pulmonary Embolism w Contrast   Result Value    Radiologist flags New diagnosis of pulmonary embolism (AA)    Narrative    EXAM: CT CHEST PULMONARY EMBOLISM W CONTRAST  LOCATION: [facility]  DATE: 6/17/2024    INDICATION: sob  COMPARISON: Two-view chest radiograph 11/15/2007  TECHNIQUE: CT chest pulmonary angiogram during arterial phase injection of IV contrast. Multiplanar reformats and MIP reconstructions were performed. Dose reduction techniques were used.   CONTRAST: Isovue 370 75mL    FINDINGS:  ANGIOGRAM CHEST: Small pulmonary artery embolus burden to segmental and subsegmental branches of the left lower lobe as well as subsegmental branches of the right lower lobe. No right heart strain. Thoracic aorta is negative for dissection.    LUNGS AND PLEURA: Scattered areas of groundglass / nodular opacity and interlobular septal thickening, along with small left and trace right pleural effusions, findings suggestive of pulmonary edema.    MEDIASTINUM/AXILLAE:  Borderline enlarged heart without significant pericardial effusion.    CORONARY ARTERY CALCIFICATION: No significant.    UPPER ABDOMEN: Probable renal sinus cysts of the left upper pole.     MUSCULOSKELETAL: Multilevel degenerative changes of the thoracic spine with flowing anterior osteophytes. No acute osseous abnormality.      Impression    IMPRESSION:  1.  Small pulmonary artery embolus burden to segmental and subsegmental branches of the left lower lobe as well as subsegmental branches of the right lower lobe. No right heart strain.   2.  Scattered areas of groundglass / nodular opacity and interlobular septal thickening, along with small left and trace right pleural effusions, findings suggestive of pulmonary edema.      [Critical Result: New diagnosis of pulmonary embolism]    Finding was identified on 6/17/2024 6:51 AM CDT.     Dr. Vaughn Anna was contacted by me on 6/17/2024 6:57 AM CDT and verbalized understanding of the critical result.

## 2024-06-17 NOTE — PHARMACY-ADMISSION MEDICATION HISTORY
Pharmacist Admission Medication History    Admission medication history is complete. The information provided in this note is only as accurate as the sources available at the time of the update.    Information Source(s): Patient via in-person    Pertinent Information:       Changes made to PTA medication list:  Added: None  Deleted: None  Changed: None    Allergies reviewed with patient and updates made in EHR: yes    Medication History Completed By: Rodolfo Dasilva RPH 6/17/2024 9:12 AM    No outpatient medications have been marked as taking for the 6/17/24 encounter (Hospital Encounter).

## 2024-06-18 VITALS
RESPIRATION RATE: 18 BRPM | WEIGHT: 219.3 LBS | HEART RATE: 91 BPM | DIASTOLIC BLOOD PRESSURE: 74 MMHG | OXYGEN SATURATION: 97 % | SYSTOLIC BLOOD PRESSURE: 167 MMHG | HEIGHT: 67 IN | BODY MASS INDEX: 34.42 KG/M2 | TEMPERATURE: 98.3 F

## 2024-06-18 PROCEDURE — 99239 HOSP IP/OBS DSCHRG MGMT >30: CPT | Performed by: STUDENT IN AN ORGANIZED HEALTH CARE EDUCATION/TRAINING PROGRAM

## 2024-06-18 PROCEDURE — G0378 HOSPITAL OBSERVATION PER HR: HCPCS

## 2024-06-18 PROCEDURE — 250N000013 HC RX MED GY IP 250 OP 250 PS 637: Performed by: STUDENT IN AN ORGANIZED HEALTH CARE EDUCATION/TRAINING PROGRAM

## 2024-06-18 RX ADMIN — APIXABAN 10 MG: 5 TABLET, FILM COATED ORAL at 08:56

## 2024-06-18 ASSESSMENT — ACTIVITIES OF DAILY LIVING (ADL)
ADLS_ACUITY_SCORE: 23
ADLS_ACUITY_SCORE: 22
ADLS_ACUITY_SCORE: 23
ADLS_ACUITY_SCORE: 22
ADLS_ACUITY_SCORE: 23
ADLS_ACUITY_SCORE: 22

## 2024-06-18 NOTE — PLAN OF CARE
[id number]  I Illness Severity: Stable    PPatient Summary:      Telemetry Orders: Yes    Interpretation of Cardiac Rhythm: A-Fib, HR 80s     Dyspnea improved and O2 sats >88% at RA or at prior home O2 therapy level:  Yes    Last Bowel Movement: None reported during admission     Acute Symptoms or Concerns: No    Is this a new or worsening symptom or concern? No    Is this symptom or concern being adequately managed? N/A    Shift Summary: Pt AxOx4 on RA. Pt up ad peggy. New A-fib and anticoagulation education done at bedside with pt. Continues to report pleuritic chest pain with deep breaths.     AActions:    Diagnostic testing and/or procedures completed, and results are available for discharge:  Met    SSituational Awareness:      Anticipated post discharge treatment plan formulated and the following needs have been identified:   None identified    SSynthesis:     Was bedside rounding completed on your shift? No     What team members present during bedside rounds?  N/A       Problem: Risk for Delirium  Goal: Improved Sleep  Outcome: Progressing     Problem: Adult Inpatient Plan of Care  Goal: Optimal Comfort and Wellbeing  Outcome: Progressing     Problem: Adult Inpatient Plan of Care  Goal: Absence of Hospital-Acquired Illness or Injury  Outcome: Progressing

## 2024-06-18 NOTE — UTILIZATION REVIEW
"Admission Status; Secondary Review Determination     Under the authority of the Utilization Management Committee, the utilization review process indicated a secondary review on Yusra Rangel.  The review outcome is based on review of the medical records, discussions with staff, and applying clinical experience noted on the date of the review.     (x) Observation Status Appropriate - This patient does not meet hospital inpatient criteria and is placed in observation status. If this patient's primary payer is Medicare and was admitted as an inpatient, Condition Code 44 should be used and patient status changed to \"observation\".     RATIONALE FOR DETERMINATION   73-year-old female admitted with unprovoked pulmonary embolism.  CT showed small subsegmental PE in left lower lung.  Vitals are normal she is not hypoxic.  Was given Lovenox yesterday then transition to Eliquis last night.  Also with newly diagnosed atrial fibrillation.  She remains hemodynamically stable today, no hypoxia, on no IV medications.    The severity of illness, intensity of service provided, expected LOS and risk for adverse outcome make the care appropriate for further observation; however, doesn't meet criteria for hospital inpatient admission. Dr Olmos notified of this determination.      The information on this document is developed by the utilization review team in order for the business office to ensure compliance.  This only denotes the appropriateness of proper admission status and does not reflect the quality of care rendered.         The definitions of Inpatient Status and Observation Status used in making the determination above are those provided in the CMS Coverage Manual, Chapter 1 and Chapter 6, section 70.4.      Sincerely,  Usman Graham MD  Utilization Review  Physician Advisor  [facility]   "

## 2024-06-18 NOTE — PLAN OF CARE
Goal Outcome Evaluation:       Pt has slept well at intervals and states she is feeling much better. Left chest pain improved without meds. Tolerating activity without [employer]. Rm air. VSS. Controlled afib 70-90's. Anticipating discharge this am. Call light in reach to make needs known.

## 2024-06-18 NOTE — DISCHARGE SUMMARY
"[facility]  Hospitalist Discharge Summary      Date of Admission:  6/17/2024  Date of Discharge:  6/18/2024  Discharging Provider: LAURI GONZÁLES MD  Discharge Service: Hospitalist Service    Discharge Diagnoses   Newly diagnosed subsegmental pulmonary embolism  Newly diagnosed atrial fibrillation    Clinically Significant Risk Factors     # Obesity: Estimated body mass index is 34.35 kg/m  as calculated from the following:    Height as of this encounter: 1.702 m (5' 7\").    Weight as of this encounter: 99.5 kg (219 lb 4.8 oz).       Follow-ups Needed After Discharge   Follow-up Appointments     Follow-up and recommended labs and tests       Follow up with primary care provider, Physician No Ref-Primary, within 7   days for hospital follow- up.  The following labs/tests are recommended:   CBC, BMP.  Further discussion of the possibility of initiating medications   to control her heart rate and blood pressure.  Hypercoagulable workup.  Follow-up with cardiology as an outpatient within the next 2 to 4 weeks.            Unresulted Labs Ordered in the Past 30 Days of this Admission       No orders found for last 31 day(s).            Discharge Disposition   Discharged to home  Condition at discharge: Stable    Hospital Course   Yusra Rangel is a 73 year old female admitted on 6/17/2024.  She has no known past medical history who presented to the hospital with pleuritic pain on the left side.  Labs largely remarkable beside elevated D-dimer.  CT PE showed small segmental and subsegmental PE on the left lower lobe and right lower lobe.    She was initiated on Lovenox, and then transition to Eliquis on 6/18.  Transthoracic echocardiogram without right heart strain.  Showed moderate pulmonary hypertension. Normal ejection fraction. Vitals are stable. Patient feels ready for discharge.    Newly diagnosed pulmonary embolism most likely unprovoked  -Vitals are stable.  -Initiated on Eliquis on " 6/17.    Plan  -Continue Eliquis 10 mg twice daily to complete a course of 7 days then transition to 5 mg twice daily thereafter.  At this point, this is most likely provoked PE and therefore patient should be on anticoagulation indefinitely.  I discussed with the patient the risk of being on anticoagulation including life-threatening intracranial bleeding.  -Discussed with the patient, she should get hypercoagulable workup as an outpatient.    Newly diagnosed atrial fibrillation  Moderate pulmonary hypertension  -Denies any history of atrial fibrillation  -Heart rate on presentation irregular, EKG suggestive of atrial fibrillation.  -Heart rate is currently between 70 and 80.  -Transthoracic echocardiogram as above.  -Discussed with the patient starting low-dose metoprolol, however, she would rather hold off starting any medications for blood pressure or heart rate.  She will discuss that with her primary care physician.    Plan  -Follow-up with primary care physician in 1 week, further discussion regarding the possibility of starting metoprolol particularly if the blood pressure remains elevated.  -Referral to cardiology for further evaluation and management of atrial fibrillation as an outpatient.      Elevated blood pressure  Possible whitecoat syndrome  -Per patient, her blood pressure runs around 110/60.  -Blood pressure during this hospitalization intermittently elevated around 160/70.  -Patient stated that she is anxious since she is in the hospital.    Plan  -Continue to monitor blood pressure at home, follow-up with primary care physician in 1 week for further evaluation and management of possible hypertension.        Consultations This Hospital Stay   PHARMACY LIAISON FOR MEDICATION COVERAGE CONSULT    Code Status   Full Code    Time Spent on this Encounter   ILAURI MD, personally saw the patient today and spent greater than 30 minutes discharging this patient.       LAURI GONZÁLES MD  [facility]  [facility]  [address] [address]  Phone: [phone]  Fax: [phone]  ______________________________________________________________________    Physical Exam   Vital Signs: Temp: 98.3  F (36.8  C) Temp src: Oral BP: (!) 167/74 Pulse: 91   Resp: 18 SpO2: 97 % O2 Device: None (Room air)    Weight: 219 lbs 4.8 oz  Physical Exam  Constitutional:       General: She is not in acute distress.     Appearance: She is not ill-appearing or toxic-appearing.   Cardiovascular:      Rate and Rhythm: Normal rate. Rhythm irregular.   Pulmonary:      Effort: Pulmonary effort is normal. No respiratory distress.      Breath sounds: No wheezing.   Skin:     General: Skin is warm and dry.   Neurological:      Mental Status: She is alert.   Psychiatric:         Mood and Affect: Mood normal.         Thought Content: Thought content normal.         Judgment: Judgment normal.             Primary Care Physician   Physician No Ref-Primary    Discharge Orders      [facility] Clinic  Referral      Adult Cardiology Eval  Referral      Reason for your hospital stay    Mild left-sided chest pain most likely due to blood clots in the lower parts of both lungs.  You were found to have an irregular rhythm called atrial fibrillation.     Follow-up and recommended labs and tests     Follow up with primary care provider, Physician No Ref-Primary, within 7 days for hospital follow- up.  The following labs/tests are recommended: CBC, BMP.  Further discussion of the possibility of initiating medications to control her heart rate and blood pressure.  Hypercoagulable workup.  Follow-up with cardiology as an outpatient within the next 2 to 4 weeks.     Activity    Your activity upon discharge: activity as tolerated     Diet    Follow this diet upon discharge: Orders Placed This Encounter      Combination Diet Low Saturated Fat Na <2400mg Diet, No Caffeine Diet       Significant Results  and Procedures   Most Recent 3 CBC's:  Recent Labs   Lab Test 06/17/24  0531 12/18/23  1050 12/27/21  0732   WBC 10.7 9.4 7.5   HGB 13.5 13.6 13.1   MCV 89 92 93    294 286     Most Recent 3 BMP's:  Recent Labs   Lab Test 06/17/24  0531 12/18/23  1050 12/27/21  0732    139 141   POTASSIUM 4.5 4.6 4.1   CHLORIDE 106 106 112*   CO2 23 25 24   BUN 21.7 18.8 15   CR 0.87 0.80 0.73   ANIONGAP 11 8 5   WILD 9.0 8.8 8.4*   * 96 123*     Most Recent TSH and T4:  Recent Labs   Lab Test 06/17/24  0750   TSH 3.38   ,   Results for orders placed or performed during the hospital encounter of 06/17/24   CT Chest Pulmonary Embolism w Contrast     Value    Radiologist flags New diagnosis of pulmonary embolism (AA)    Narrative    EXAM: CT CHEST PULMONARY EMBOLISM W CONTRAST  LOCATION: [facility]  DATE: 6/17/2024    INDICATION: sob  COMPARISON: Two-view chest radiograph 11/15/2007  TECHNIQUE: CT chest pulmonary angiogram during arterial phase injection of IV contrast. Multiplanar reformats and MIP reconstructions were performed. Dose reduction techniques were used.   CONTRAST: Isovue 370 75mL    FINDINGS:  ANGIOGRAM CHEST: Small pulmonary artery embolus burden to segmental and subsegmental branches of the left lower lobe as well as subsegmental branches of the right lower lobe. No right heart strain. Thoracic aorta is negative for dissection.    LUNGS AND PLEURA: Scattered areas of groundglass / nodular opacity and interlobular septal thickening, along with small left and trace right pleural effusions, findings suggestive of pulmonary edema.    MEDIASTINUM/AXILLAE: Borderline enlarged heart without significant pericardial effusion.    CORONARY ARTERY CALCIFICATION: No significant.    UPPER ABDOMEN: Probable renal sinus cysts of the left upper pole.     MUSCULOSKELETAL: Multilevel degenerative changes of the thoracic spine with flowing anterior osteophytes. No acute osseous abnormality.       Impression    IMPRESSION:  1.  Small pulmonary artery embolus burden to segmental and subsegmental branches of the left lower lobe as well as subsegmental branches of the right lower lobe. No right heart strain.   2.  Scattered areas of groundglass / nodular opacity and interlobular septal thickening, along with small left and trace right pleural effusions, findings suggestive of pulmonary edema.      [Critical Result: New diagnosis of pulmonary embolism]    Finding was identified on 2024 6:51 AM CDT.     Dr. Vaughn Anna was contacted by me on 2024 6:57 AM CDT and verbalized understanding of the critical result.    Echocardiogram Complete     Value    LVEF  65-70%    Narrative    [id number]  [id number]  [id number]  [id number]^ELIECER^[place] [address]     Name: ARNOLD CHOWDHURY  MRN: [id number]  : 1950  Study Date: 2024 02:44 PM  Age: 73 yrs  Gender: Female  Patient Location: [facility]  Reason For Study: Atrial Fibrillation  Ordering Physician: LAURI GONZÁLES  Performed By: HEATHER     BSA: 2.1 m2  Height: 66 in  Weight: 220 lb  HR: 73  BP: 155/74 mmHg  ______________________________________________________________________________  Procedure  Complete Portable Echo Adult. Definity (NDC #62643-298) given intravenously.  Adequate quality two-dimensional was performed and interpreted. Adequate  quality color and spectral Doppler were performed and interpreted. There is no  comparison study available.  ______________________________________________________________________________  Interpretation Summary     1. Left ventricular chamber size, wall thickness and systolic function are  normal. The visually estimated left ventricular ejection fraction is 65-70%.  2. Right ventricular chamber size and systolic function are normal.  3. Mild left atrial enlargement.  4. No hemodynamically significant valvular abnormalities.  5. Moderate pulmonary hypertension is  present with an estimated pulmonary  artery systolic pressure of 51 mmHg.  6. No prior study available for comparison.  ______________________________________________________________________________  Left Ventricle  The left ventricle is normal in size. There is normal left ventricular wall  thickness. Left ventricular systolic function is normal. The visual ejection  fraction is 65-70%. Diastolic function not assessed due to atrial  fibrillation. No regional wall motion abnormalities noted.     Right Ventricle  Normal right ventricle size and systolic function.     Atria  The left atrium is mildly dilated. Right atrial size is normal.     Mitral Valve  The mitral valve leaflets are mildly thickened. There is mild mitral annular  calcification. There is trace mitral regurgitation. There is no mitral valve  stenosis.     Tricuspid Valve  Tricuspid valve leaflets appear normal. There is mild (1+) tricuspid  regurgitation. The right ventricular systolic pressure is approximated at 47.8  mmHg plus the right atrial pressure. Moderate (46-55mmHg) pulmonary  hypertension is present. There is no tricuspid stenosis.     Aortic Valve  The aortic valve is trileaflet with aortic valve sclerosis. No aortic  regurgitation is present. No aortic stenosis is present.     Pulmonic Valve  The pulmonic valve is not well visualized. There is trace pulmonic valvular  regurgitation. There is no pulmonic valvular stenosis.     Vessels  The aorta root is normal. The thoracic aorta cannot be assessed. IVC diameter  <2.1 cm collapsing >50% with sniff suggests a normal RA pressure of 3 mmHg.     Pericardium  There is no pericardial effusion.     Rhythm  The rhythm was atrial fibrillation.  ______________________________________________________________________________  MMode/2D Measurements & Calculations     IVSd: 0.90 cm  LVIDd: 4.7 cm  LVIDs: 3.3 cm  LVPWd: 0.94 cm  FS: 31.1 %  LV mass(C)d: 149.3 grams  LV mass(C)dI: 71.7 grams/m2  Ao root  diam: 3.2 cm  LVOT diam: 2.0 cm  LVOT area: 3.0 cm2  Ao root diam index Ht(cm/m): 1.9  Ao root diam index BSA (cm/m2): 1.5  EF Biplane: 65.6 %  LA Volume Indexed (AL/bp): 37.5 ml/m2     RV Base: 3.1 cm  RWT: 0.40  TAPSE: 2.1 cm     Doppler Measurements & Calculations  MV E max filiberto: 132.5 cm/sec  MV A max filiberto: 22.7 cm/sec  MV E/A: 5.8  MV max P.7 mmHg  MV mean PG: 3.2 mmHg  MV V2 VTI: 26.4 cm  MVA(VTI): 2.1 cm2  MV dec time: 0.19 sec  Ao V2 max: 121.5 cm/sec  Ao max P.0 mmHg  Ao V2 mean: 84.0 cm/sec  Ao mean PG: 3.4 mmHg  Ao V2 VTI: 22.4 cm  DEMETRIUS(I,D): 2.5 cm2  DEMETRIUS(V,D): 2.6 cm2  LV V1 max P.5 mmHg  LV V1 max: 106.3 cm/sec  LV V1 VTI: 18.7 cm  SV(LVOT): 56.7 ml  SI(LVOT): 27.2 ml/m2  PA acc time: 0.12 sec  TR max filiberto: 345.8 cm/sec  TR max P.8 mmHg  AV Filiberto Ratio (DI): 0.87  DEMETRIUS Index (cm2/m2): 1.2  E/E': 11.2     E/E' av.8  Lateral E/e': 11.3  Medial E/e': 12.4  Peak E' Filiberto: 11.8 cm/sec  RV S Filiberto: 14.9 cm/sec     ______________________________________________________________________________  Report approved by: Nicole Wolf 2024 03:54 PM             Discharge Medications   Current Discharge Medication List        START taking these medications    Details   apixaban ANTICOAGULANT (ELIQUIS) 5 MG tablet Take 2 tablets (10 mg) by mouth 2 times daily for 6 days, THEN 1 tablet (5 mg) 2 times daily for 360 days.  Qty: 744 tablet, Refills: 0    Associated Diagnoses: New onset atrial fibrillation (H); Multiple subsegmental pulmonary emboli without acute cor pulmonale (H)           Allergies   No Known Allergies

## 2024-06-18 NOTE — PLAN OF CARE
Pt discharged to home, transported by spouse. Discharge education provided including medication instructions and followup instructions. AVS provided. Questions answered, understanding verbalized by pt and family. PIV removed. All belongings returned and sent home with pt.

## 2024-06-28 ENCOUNTER — OFFICE VISIT (OUTPATIENT)
Dept: CARDIOLOGY | Facility: CLINIC | Age: 74
End: 2024-06-28
Payer: COMMERCIAL

## 2024-06-28 VITALS
WEIGHT: 215 LBS | BODY MASS INDEX: 33.74 KG/M2 | DIASTOLIC BLOOD PRESSURE: 68 MMHG | HEIGHT: 67 IN | HEART RATE: 90 BPM | SYSTOLIC BLOOD PRESSURE: 138 MMHG | RESPIRATION RATE: 22 BRPM

## 2024-06-28 DIAGNOSIS — I48.91 NEW ONSET ATRIAL FIBRILLATION (H): ICD-10-CM

## 2024-06-28 PROCEDURE — 99204 OFFICE O/P NEW MOD 45 MIN: CPT | Performed by: INTERNAL MEDICINE

## 2024-06-28 RX ORDER — METOPROLOL SUCCINATE 25 MG/1
25 TABLET, EXTENDED RELEASE ORAL DAILY
Qty: 90 TABLET | Refills: 3 | Status: SHIPPED | OUTPATIENT
Start: 2024-06-28 | End: 2024-07-03

## 2024-06-28 NOTE — LETTER
6/28/2024    Abby Horowitz, APRN CNP  [address] [address]    RE: Yusra Rangel       Dear Colleague,     I had the pleasure of seeing Yusra Rangel in the [facility].    CARDIOLOGY RAPID ACCESS CLINIC CONSULT NOTE     Assessment/Plan:   1.  New onset atrial fibrillation.  Heart rate control is reasonable today, but with some decrease in her activity tolerance will try metoprolol 25 mg daily.  This could be discontinued if it does not improve her activity tolerance.  We discussed that with her [id number] score of at least 3 long-term anticoagulation is suggested in the absence of contraindications.  She is agreeable with this approach.  2.  Acute pulmonary embolism, uncertain etiology, these appear unprovoked.  Also warrants long-term anticoagulation.  3.  Sedentary lifestyle.  Advocated 150 minutes of moderate aerobic activities each week.    Follow-up as needed or for progressive symptoms     History of Present Illness:     It is my pleasure to see Yusra Rangel at the [facility] [place] [facility] RAPID ACCESS clinic for evaluation of atrial fibrillation.  She is accompanied by her  today.    Yusra Rangel is a 73 year old female with a benign past medical history, who seldom sees physicians.  She had cataract surgery done earlier this year, last ECG was 12/2021.  6/17/2024 she had done some lifting of heavy bottles and had some sharp left-sided chest pain that was pleuritic in nature.  She also noticed that she was more short of breath.  Sometimes this chest pain would radiate in the left side of her neck.  For evaluation she presented to the emergency room for this discomfort and was found to have bilateral pulmonary emboli along with atrial fibrillation.  Fibrillation rate was reasonably controlled.  There was no obvious trigger for her pulmonary embolism, and long-term anticoagulation was recommended.  She was started on Eliquis 5 mg daily and within a  couple of days her chest pains have resolved and she has felt well since that time.  She still has no awareness of atrial fibrillation though she does feel that perhaps she is more short of breath carrying things than she was a year ago.  She does not engage in much regular exercise.  She has had no syncope or near syncopal spells.  She has no orthopnea or paroxysmal nocturnal dyspnea.  She does snore though her sleep is generally restorative.  She has had no bruising or bleeding problems since starting the Eliquis, and offers no complaints today.    Alcohol intake is rare.  Weight is stable.  Echocardiogram showed normal LV function, moderate pulmonary hypertension was suggested in the setting of the pulmonary emboli.  She has been monitoring her blood pressure.  Systolics are generally in the 130s to 140s, heart rates in the 80s to 90s.  She recalls years ago when she would donate blood her heart rate was usually consistently in the 50s to 60s.    Past Medical History:     Patient Active Problem List   Diagnosis    Transient global amnesia    Leukocytosis    History of hyperglycemia    New onset atrial fibrillation (H)    Multiple subsegmental pulmonary emboli without acute cor pulmonale (H)       Past Surgical History:     Past Surgical History:   Procedure Laterality Date    VAGINAL DELIVERY N/A     Vaginal delivery x 4    WRIST SURGERY         Family History:     Family History   Problem Relation Age of Onset    Rectal Cancer Mother          age 82    Lung Cancer Father          age 69    Heart Disease Brother     Heart Disease Brother      Family history reviewed and is not pertinent to the chief complaint or presenting problem    Social History:    reports that she has never smoked. She has never used smokeless tobacco. She reports current alcohol use.    Exercise: Minimal, housework, cares for her 6-year-old grandchild.  Occasionally carries groceries in with no difficulties, though possibly more  "short of breath over the last couple of weeks than what she was before.    Sleep: Generally restorative.  Does snore.  No apnea noted.  No daytime sleepiness.    Meds:     Current Outpatient Medications   Medication Sig Dispense Refill    apixaban ANTICOAGULANT (ELIQUIS) 5 MG tablet Take 2 tablets (10 mg) by mouth 2 times daily for 6 days, THEN 1 tablet (5 mg) 2 times daily for 360 days. 744 tablet 0       Allergies:   Patient has no known allergies.    Review of Systems:     Positive for chest pain, shortness of breath with activity, irregular heartbeat.  12 point review of systems otherwise negative     Please refer above for cardiac ROS details.       Objective:      Physical Exam  97.5 kg (215 lb)  1.702 m (5' 7\")  Body mass index is 33.67 kg/m .  /68 (BP Location: Left arm, Patient Position: Sitting, Cuff Size: Adult Large)   Pulse 90   Resp 22   Ht 1.702 m (5' 7\")   Wt 97.5 kg (215 lb)   BMI 33.67 kg/m    Resting heart rate 88 bpm.  Increases to 116 bpm after climbing 10 steps.  No shortness of breath.      General Appearance : Awake, Alert, No acute distress  HEENT: No Scleral icterus; the mucous membranes were pink and moist.  Conjunctivae not injected  Neck:  No cervical bruits, jugular venous distention, or thyromegaly   Chest: The spine was straight. Chest wall symmetric  Lungs: Respirations unlabored; the lungs are clear to auscultation.  No wheezing   Cardiovascular:   Normal point of maximal impulse.  Auscultation reveals irregularly irregular first and second heart sounds with no murmurs, rubs, or gallops.  Carotid, radial, and dorsalis pedal pulses are intact and symmetric.    Abdomen: Rounded.  No organomegaly, masses, bruits, or tenderness. Bowels sounds are present  Extremities: No edema  Skin: No xanthelasma. Warm, Dry.  Musculoskeletal: No tenderness.  Neurologic: Alert and oriented ×3. Speech is fluent.      EKG (personally reviewed):  6/17/2024: Atrial fibrillation 77 bpm.  ST and T " wave changes consistent with lateral ischemia new versus 12/26/2021    Cardiac Imaging Studies:  Echocardiogram 6/17/2024:  1. Left ventricular chamber size, wall thickness and systolic function are  normal. The visually estimated left ventricular ejection fraction is 65-70%.  2. Right ventricular chamber size and systolic function are normal.  3. Mild left atrial enlargement.  4. No hemodynamically significant valvular abnormalities.  5. Moderate pulmonary hypertension is present with an estimated pulmonary  artery systolic pressure of 51 mmHg.  6. No prior study available for comparison.    CT chest PE run 6/17/2024:CORONARY ARTERY CALCIFICATION: No significant.   1.  Small pulmonary artery embolus burden to segmental and subsegmental branches of the left lower lobe as well as subsegmental branches of the right lower lobe. No right heart strain.   2.  Scattered areas of groundglass / nodular opacity and interlobular septal thickening, along with small left and trace right pleural effusions, findings suggestive of pulmonary edema.     Lab Results    Chemistry/lipid CBC Cardiac Enzymes/BNP/TSH/INR   Recent Labs   Lab Test 12/18/23  1050   CHOL 227*   HDL 52   *   TRIG 124     Recent Labs   Lab Test 12/18/23  1050   *     Recent Labs   Lab Test 06/17/24  0531      POTASSIUM 4.5   CHLORIDE 106   CO2 23   *   BUN 21.7   CR 0.87   GFRESTIMATED 69   WILD 9.0     Recent Labs   Lab Test 06/17/24  0531 12/18/23  1050 12/27/21  0732   CR 0.87 0.80 0.73     Recent Labs   Lab Test 12/18/23  1050   A1C 5.9*          Recent Labs   Lab Test 06/17/24  0531   WBC 10.7   HGB 13.5   HCT 40.9   MCV 89        Recent Labs   Lab Test 06/17/24  0531 12/18/23  1050 12/27/21  0732   HGB 13.5 13.6 13.1    Recent Labs   Lab Test 12/26/21  0656   TROPONINI <0.01     Recent Labs   Lab Test 06/17/24  0531   NTBNPI 879     Recent Labs   Lab Test 06/17/24  0750   TSH 3.38     Recent Labs   Lab Test 12/26/21  0715    INR 1.10          Andre Gould MD, MD FACC      This note created using Dragon voice recognition software. Sound alike errors may have escaped editing.       Thank you for allowing me to participate in the care of your patient.      Sincerely,     Andre Gould MD     [facility]  cc:   Vaughn Hanna Ohever, DO  [facility]  [facility] [place] [address]

## 2024-06-28 NOTE — PATIENT INSTRUCTIONS
Start metoprolol succinate 25 mg daily in addition to the Eliquis.  The metoprolol can help to keep your heart rate under control and may improve your stamina.  Metoprolol could be discontinued if you do not notice any improvement in your ability to do things, or certainly if your symptoms worsen.  In that case and monitor may be helpful.  Try to get at least 150 minutes of moderate aerobic activities in each week.  A brisk 20-minute walk on a daily basis is a great habit for life!  Consider sleep apnea evaluation if your sleep becomes less restorative.

## 2024-07-03 ENCOUNTER — OFFICE VISIT (OUTPATIENT)
Dept: FAMILY MEDICINE | Facility: CLINIC | Age: 74
End: 2024-07-03
Payer: COMMERCIAL

## 2024-07-03 VITALS
HEIGHT: 67 IN | SYSTOLIC BLOOD PRESSURE: 136 MMHG | HEART RATE: 73 BPM | BODY MASS INDEX: 34.34 KG/M2 | WEIGHT: 218.8 LBS | DIASTOLIC BLOOD PRESSURE: 54 MMHG | TEMPERATURE: 98.4 F | OXYGEN SATURATION: 94 % | RESPIRATION RATE: 16 BRPM

## 2024-07-03 DIAGNOSIS — Z53.20 COLONOSCOPY REFUSED: ICD-10-CM

## 2024-07-03 DIAGNOSIS — I48.91 NEW ONSET ATRIAL FIBRILLATION (H): Primary | ICD-10-CM

## 2024-07-03 DIAGNOSIS — Z28.20 VACCINE REFUSED BY PATIENT: ICD-10-CM

## 2024-07-03 DIAGNOSIS — I26.94 MULTIPLE SUBSEGMENTAL PULMONARY EMBOLI WITHOUT ACUTE COR PULMONALE (H): ICD-10-CM

## 2024-07-03 PROCEDURE — 99214 OFFICE O/P EST MOD 30 MIN: CPT | Performed by: NURSE PRACTITIONER

## 2024-07-03 PROCEDURE — G2211 COMPLEX E/M VISIT ADD ON: HCPCS | Performed by: NURSE PRACTITIONER

## 2024-07-03 RX ORDER — RESPIRATORY SYNCYTIAL VIRUS VACCINE 120MCG/0.5
0.5 KIT INTRAMUSCULAR ONCE
Qty: 1 EACH | Refills: 0 | Status: CANCELLED | OUTPATIENT
Start: 2024-07-03 | End: 2024-07-03

## 2024-07-03 RX ORDER — METOPROLOL SUCCINATE 25 MG/1
25 TABLET, EXTENDED RELEASE ORAL DAILY
Qty: 90 TABLET | Refills: 3 | Status: SHIPPED | OUTPATIENT
Start: 2024-07-03

## 2024-07-03 NOTE — PROGRESS NOTES
"     Assessment & Plan     New onset atrial fibrillation (H)   Heart rate control is reasonable today  metoprolol 25 mg daily.  Has show to improve her activity tolerance.    [id number] score of 5 and a score of at least 3 - long-term anticoagulation is suggested in the absence of contraindications.  She is agreeable with this approach.  2.  Acute pulmonary embolism,   I recommend:150 minutes of moderate aerobic activities each week.    Pt has not s/sx of HF today - discussed pathophys and s/sx to watch out for   Follow up in 6 months.   - apixaban ANTICOAGULANT (ELIQUIS) 5 MG tablet  Dispense: 180 tablet; Refill: 3  - metoprolol succinate ER (TOPROL XL) 25 MG 24 hr tablet  Dispense: 90 tablet; Refill: 3    Multiple subsegmental pulmonary emboli without acute cor pulmonale (H)  uncertain etiology, these appear unprovoked.  Also warrants long-term anticoagulation.  Asymptomatic today  Continue eliquis 5mg BID  - apixaban ANTICOAGULANT (ELIQUIS) 5 MG tablet  Dispense: 180 tablet; Refill: 3    Colonoscopy refused  Politely declined    Vaccine refused by patient  Politely declined    Reviewed hospital note, reviewed history, review labs, reviewed cardiology note.       Spent 30mins doing chart review, history and exam, patient education, documentation and further activities per the note.        MED REC REQUIRED  Post Medication Reconciliation Status:  Discharge medications reconciled and changed, see notes/orders  BMI  Estimated body mass index is 34.27 kg/m  as calculated from the following:    Height as of this encounter: 1.702 m (5' 7\").    Weight as of this encounter: 99.2 kg (218 lb 12.8 oz).   Weight management plan: Discussed healthy diet and exercise guidelines          Estrellita Meng is a 73 year old, presenting for the following health issues:  Hospital F/U (Hospital follow up for chest pain, blood clot, and Afib.)        7/3/2024     8:52 AM   Additional Questions   Roomed by STEF-LPN   Accompanied by " NONE     HPI     Date of Admission:  6/17/2024  Date of Discharge:  6/18/2024  Discharging Provider: LAURI GONZÁLES MD  Newly diagnosed subsegmental pulmonary embolism  Newly diagnosed atrial fibrillation    Seen at [facility]     Yusra Rangel is a 73 year old female with a benign past medical history, who seldom sees physicians.  She had cataract surgery done earlier this year, last ECG was 12/2021.  6/17/2024 she had done some lifting of heavy bottles and had some sharp left-sided chest pain that was pleuritic in nature.  She also noticed that she was more short of breath.  Sometimes this chest pain would radiate in the left side of her neck.  For evaluation she presented to the emergency room for this discomfort and was found to have bilateral pulmonary emboli along with atrial fibrillation.  Fibrillation rate was reasonably controlled.  There was no obvious trigger for her pulmonary embolism, and long-term anticoagulation was recommended.  She was started on Eliquis 5 mg daily and within a couple of days her chest pains have resolved and she has felt well since that time.  She still has no awareness of atrial fibrillation though she does feel that perhaps she is more short of breath carrying things than she was a year ago.  She does not engage in much regular exercise.  She has had no syncope or near syncopal spells.  She has no orthopnea or paroxysmal nocturnal dyspnea.  She does snore though her sleep is generally restorative.  She has had no bruising or bleeding problems since starting the Eliquis, and offers no complaints today.     Alcohol intake is rare.  Weight is stable.  Echocardiogram showed normal LV function, moderate pulmonary hypertension was suggested in the setting of the pulmonary emboli.  She has been monitoring her blood pressure.  Systolics are generally in the 130s to 140s, heart rates in the 80s to 90s.  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    She was started on 6/28/24 metoprolol by cardiology - they want to see her going forward only as needed.     Since starting on metoprolol she feels that she has improved her physical activity currently she is walking  Up and down his blood level which is 11 steps without significant palpitations, shortness of breath    -Patient says that a true test will be going up and down the stairs at a [employer] game which she previously did not tolerate.    Currently is very active as she does watch her 6-year-old grandson            Review of Systems  Constitutional, HEENT, cardiovascular, pulmonary, gi and gu systems are negative, except as otherwise noted.      Objective    /54   Pulse 73   Temp 98.4  F (36.9  C) (Oral)   Resp 16   Ht 1.702 m (5' 7\")   Wt 99.2 kg (218 lb 12.8 oz)   SpO2 94%   BMI 34.27 kg/m    Body mass index is 34.27 kg/m .  Physical Exam   GENERAL: alert and no distress  NECK: no adenopathy, no asymmetry, masses, or scars  RESP: lungs clear to auscultation - no rales, rhonchi or wheezes  CV: regular rate and irregular rhythm, normal S1 S2, possible gallop, no murmur, click or rub, no peripheral edema, negative for jugular vein distention  ABDOMEN: soft, nontender, no hepatosplenomegaly, no masses and bowel sounds normal  MS: no gross musculoskeletal defects noted, no edema            Signed Electronically by: VINEET Abad CNP    "